# Patient Record
Sex: FEMALE | Race: WHITE | NOT HISPANIC OR LATINO | Employment: OTHER | URBAN - METROPOLITAN AREA
[De-identification: names, ages, dates, MRNs, and addresses within clinical notes are randomized per-mention and may not be internally consistent; named-entity substitution may affect disease eponyms.]

---

## 2020-09-15 ENCOUNTER — HOSPITAL ENCOUNTER (EMERGENCY)
Facility: HOSPITAL | Age: 50
Discharge: LEFT BEFORE DISCHARGE | End: 2020-09-15
Attending: EMERGENCY MEDICINE | Admitting: EMERGENCY MEDICINE

## 2020-09-15 VITALS
TEMPERATURE: 96.8 F | HEART RATE: 124 BPM | RESPIRATION RATE: 18 BRPM | WEIGHT: 150 LBS | BODY MASS INDEX: 24.99 KG/M2 | HEIGHT: 65 IN | OXYGEN SATURATION: 97 % | SYSTOLIC BLOOD PRESSURE: 134 MMHG | DIASTOLIC BLOOD PRESSURE: 89 MMHG

## 2020-09-15 DIAGNOSIS — F13.10 BENZODIAZEPINE ABUSE (HCC): ICD-10-CM

## 2020-09-15 DIAGNOSIS — F10.10 ALCOHOL ABUSE: Primary | ICD-10-CM

## 2020-09-15 LAB
ALBUMIN SERPL-MCNC: 4.4 G/DL (ref 3.5–5.2)
ALBUMIN/GLOB SERPL: 1.8 G/DL
ALP SERPL-CCNC: 82 U/L (ref 39–117)
ALT SERPL W P-5'-P-CCNC: 10 U/L (ref 1–33)
AMPHET+METHAMPHET UR QL: NEGATIVE
AMPHETAMINES UR QL: NEGATIVE
ANION GAP SERPL CALCULATED.3IONS-SCNC: 13.1 MMOL/L (ref 5–15)
AST SERPL-CCNC: 18 U/L (ref 1–32)
BACTERIA UR QL AUTO: ABNORMAL /HPF
BARBITURATES UR QL SCN: NEGATIVE
BASOPHILS # BLD AUTO: 0.02 10*3/MM3 (ref 0–0.2)
BASOPHILS NFR BLD AUTO: 0.3 % (ref 0–1.5)
BENZODIAZ UR QL SCN: POSITIVE
BILIRUB SERPL-MCNC: <0.2 MG/DL (ref 0–1.2)
BILIRUB UR QL STRIP: NEGATIVE
BUN SERPL-MCNC: 8 MG/DL (ref 6–20)
BUN/CREAT SERPL: 9.3 (ref 7–25)
BUPRENORPHINE SERPL-MCNC: NEGATIVE NG/ML
CALCIUM SPEC-SCNC: 9.1 MG/DL (ref 8.6–10.5)
CANNABINOIDS SERPL QL: NEGATIVE
CHLORIDE SERPL-SCNC: 103 MMOL/L (ref 98–107)
CLARITY UR: CLEAR
CO2 SERPL-SCNC: 24.9 MMOL/L (ref 22–29)
COCAINE UR QL: NEGATIVE
COLOR UR: YELLOW
CREAT SERPL-MCNC: 0.86 MG/DL (ref 0.57–1)
DEPRECATED RDW RBC AUTO: 49.8 FL (ref 37–54)
EOSINOPHIL # BLD AUTO: 0.07 10*3/MM3 (ref 0–0.4)
EOSINOPHIL NFR BLD AUTO: 1.2 % (ref 0.3–6.2)
ERYTHROCYTE [DISTWIDTH] IN BLOOD BY AUTOMATED COUNT: 13.5 % (ref 12.3–15.4)
ETHANOL BLD-MCNC: 71 MG/DL (ref 0–10)
ETHANOL UR QL: 0.07 %
GFR SERPL CREATININE-BSD FRML MDRD: 70 ML/MIN/1.73
GLOBULIN UR ELPH-MCNC: 2.5 GM/DL
GLUCOSE SERPL-MCNC: 105 MG/DL (ref 65–99)
GLUCOSE UR STRIP-MCNC: NEGATIVE MG/DL
HCT VFR BLD AUTO: 39.3 % (ref 34–46.6)
HGB BLD-MCNC: 12.8 G/DL (ref 12–15.9)
HGB UR QL STRIP.AUTO: ABNORMAL
HYALINE CASTS UR QL AUTO: ABNORMAL /LPF
IMM GRANULOCYTES # BLD AUTO: 0.04 10*3/MM3 (ref 0–0.05)
IMM GRANULOCYTES NFR BLD AUTO: 0.7 % (ref 0–0.5)
KETONES UR QL STRIP: NEGATIVE
LEUKOCYTE ESTERASE UR QL STRIP.AUTO: NEGATIVE
LYMPHOCYTES # BLD AUTO: 1.8 10*3/MM3 (ref 0.7–3.1)
LYMPHOCYTES NFR BLD AUTO: 30.5 % (ref 19.6–45.3)
MCH RBC QN AUTO: 32.3 PG (ref 26.6–33)
MCHC RBC AUTO-ENTMCNC: 32.6 G/DL (ref 31.5–35.7)
MCV RBC AUTO: 99.2 FL (ref 79–97)
METHADONE UR QL SCN: NEGATIVE
MONOCYTES # BLD AUTO: 0.41 10*3/MM3 (ref 0.1–0.9)
MONOCYTES NFR BLD AUTO: 6.9 % (ref 5–12)
NEUTROPHILS NFR BLD AUTO: 3.57 10*3/MM3 (ref 1.7–7)
NEUTROPHILS NFR BLD AUTO: 60.4 % (ref 42.7–76)
NITRITE UR QL STRIP: NEGATIVE
NRBC BLD AUTO-RTO: 0 /100 WBC (ref 0–0.2)
OPIATES UR QL: NEGATIVE
OXYCODONE UR QL SCN: NEGATIVE
PCP UR QL SCN: NEGATIVE
PH UR STRIP.AUTO: 6.5 [PH] (ref 4.5–8)
PLATELET # BLD AUTO: 364 10*3/MM3 (ref 140–450)
PMV BLD AUTO: 8.5 FL (ref 6–12)
POTASSIUM SERPL-SCNC: 3.9 MMOL/L (ref 3.5–5.2)
PROPOXYPH UR QL: NEGATIVE
PROT SERPL-MCNC: 6.9 G/DL (ref 6–8.5)
PROT UR QL STRIP: NEGATIVE
RBC # BLD AUTO: 3.96 10*6/MM3 (ref 3.77–5.28)
RBC # UR: ABNORMAL /HPF
REF LAB TEST METHOD: ABNORMAL
SODIUM SERPL-SCNC: 141 MMOL/L (ref 136–145)
SP GR UR STRIP: 1.01 (ref 1–1.03)
SQUAMOUS #/AREA URNS HPF: ABNORMAL /HPF
TRICYCLICS UR QL SCN: NEGATIVE
UROBILINOGEN UR QL STRIP: ABNORMAL
WBC # BLD AUTO: 5.91 10*3/MM3 (ref 3.4–10.8)
WBC UR QL AUTO: ABNORMAL /HPF

## 2020-09-15 PROCEDURE — 99282 EMERGENCY DEPT VISIT SF MDM: CPT

## 2020-09-15 PROCEDURE — 81001 URINALYSIS AUTO W/SCOPE: CPT | Performed by: EMERGENCY MEDICINE

## 2020-09-15 PROCEDURE — 80053 COMPREHEN METABOLIC PANEL: CPT | Performed by: EMERGENCY MEDICINE

## 2020-09-15 PROCEDURE — 80307 DRUG TEST PRSMV CHEM ANLYZR: CPT | Performed by: EMERGENCY MEDICINE

## 2020-09-15 PROCEDURE — 85025 COMPLETE CBC W/AUTO DIFF WBC: CPT | Performed by: EMERGENCY MEDICINE

## 2020-09-15 PROCEDURE — 99284 EMERGENCY DEPT VISIT MOD MDM: CPT | Performed by: EMERGENCY MEDICINE

## 2020-09-15 NOTE — ED NOTES
"Pt seemed very jumpy and agitated but cooperative. She stated she was here because she thought she was in liver failure because of how much alcohol she had been drinking lately.  She mentioned treatment but said clarified that she was in ER for bloodwork.  Daughter Michelle left phone number [phone].  Pt denied SI and HI \"not today\"     Cyndie Fatima RN  09/15/20 3444    "

## 2020-09-15 NOTE — ED NOTES
"Asked pt what she thought the ER could do for her today.  She said she wanted labs to find out if she was in liver failure \"since I'm itching and I looked that up online and that's one of the symptoms and i've been drinking a lot\"     Cyndie Fatima, RN  09/15/20 6908    "

## 2020-09-15 NOTE — ED NOTES
"Pt took BP cuff off before it was finished and refused to allow it to finish \"that shouldn't be painful\"     Cyndie Fatima, RN  09/15/20 6511    "

## 2020-09-15 NOTE — ED PROVIDER NOTES
EMERGENCY DEPARTMENT ENCOUNTER    Room Number:  D/D  Date of encounter:  9/15/2020  PCP: Provider, No Known  Historian: Patient     I used full protective equipment while examining this patient.  This includes face mask, gloves and protective eyewear.  I washed my hands before entering the room and immediately upon leaving the room      HPI:  Chief Complaint: Once to stop drinking alcohol  A complete HPI/ROS/PMH/PSH/SH/FH are unobtainable due to: None    Context: Emily Garcia is a 50 y.o. female who presents to the ED c/o wants to stop drinking alcohol.  Patient states she is here to get help with her drinking problem.  She states her last drink was about 1 hour ago and she drank about a pint.  She drinks about a pint on a daily basis.  She states she is also been using benzodiazepines which she gets off of the street periodically.  She says she took a 1 mg Xanax yesterday but has not taken any today.  She states she does not take them on a daily basis.  She states her reason for wanting to stop drinking is because she is worried about her body and she is worried she could be developing liver problems.  She denies suicidal ideation.  Patient states she was brought to the ER by her daughter who is not present at this time.  She states she is disabled but is unsure of her disability.  She states that she lives at home alone.  She denies chronic medical problems other than COPD and does not take medications on a regular basis but is supposed to be taking Prozac which she does not currently.  Patient states she does not currently use IV drugs but has used IV drugs in the remote past.      PAST MEDICAL HISTORY  Active Ambulatory Problems     Diagnosis Date Noted   • No Active Ambulatory Problems     Resolved Ambulatory Problems     Diagnosis Date Noted   • No Resolved Ambulatory Problems     Past Medical History:   Diagnosis Date   • Anxiety    • COPD (chronic obstructive pulmonary disease) (CMS/[facility])    • Depression     • OCD (obsessive compulsive disorder)    • Substance abuse (CMS/[facility])          PAST SURGICAL HISTORY  Past Surgical History:   Procedure Laterality Date   • CHOLECYSTECTOMY     • HYSTERECTOMY     • TUBAL ABDOMINAL LIGATION           FAMILY HISTORY  History reviewed. No pertinent family history.      SOCIAL HISTORY  Social History     Socioeconomic History   • Marital status:      Spouse name: Not on file   • Number of children: Not on file   • Years of education: Not on file   • Highest education level: Not on file   Tobacco Use   • Smoking status: Current Every Day Smoker     Packs/day: 1.00     Types: Cigarettes   Substance and Sexual Activity   • Alcohol use: Yes     Comment: last drink was an hour ago, a fifth every day for    • Drug use: Yes     Comment: benzos, last use yesterday, h/o iv drugs   • Sexual activity: Defer         ALLERGIES  Patient has no known allergies.       REVIEW OF SYSTEMS  Review of Systems   Constitutional: Positive for fatigue. Negative for fever.   HENT: Negative.  Negative for sore throat.    Eyes: Negative.    Respiratory: Negative.  Negative for cough.    Cardiovascular: Negative.  Negative for chest pain.   Gastrointestinal: Positive for nausea.   Genitourinary: Positive for dysuria.        LMP-status post partial hysterectomy   Musculoskeletal: Negative.  Negative for back pain.   Skin: Negative.  Negative for rash.   Neurological: Negative.  Negative for headaches.   Psychiatric/Behavioral: Positive for behavioral problems (Alcohol abuse).   All other systems reviewed and are negative.          PHYSICAL EXAM    I have reviewed the triage vital signs and nursing notes.    ED Triage Vitals [09/15/20 1735]   Temp Heart Rate Resp BP SpO2   96.8 °F (36 °C) (!) 124 18 -- 97 %      Temp src Heart Rate Source Patient Position BP Location FiO2 (%)   Temporal Monitor -- -- --       Physical Exam  GENERAL: Alert female who is in no apparent distress.  Her speech is coherent and  clear and she does not seem overtly intoxicated.  She is oriented x3.  HENT: nares patent, atraumatic-oropharynx clear  EYES: no scleral icterus  CV: regular rhythm, regular rate-no murmur.  Tachycardia noted in triage is not present on my examination  RESPIRATORY: normal effort, respirations unlabored-saturations 98% on room air  ABDOMEN: soft, mild diffuse tenderness without significant swelling  MUSCULOSKELETAL: no deformity- no unusual swelling or tenderness to palpation  NEURO: Strength, sensation, and coordination are grossly intact.  Speech and mentation are unremarkable  SKIN: warm, dry-no unusual rashes are noted      LAB RESULTS  Recent Results (from the past 24 hour(s))   Comprehensive Metabolic Panel    Collection Time: 09/15/20  6:16 PM    Specimen: Blood   Result Value Ref Range    Glucose 105 (H) 65 - 99 mg/dL    BUN 8 6 - 20 mg/dL    Creatinine 0.86 0.57 - 1.00 mg/dL    Sodium 141 136 - 145 mmol/L    Potassium 3.9 3.5 - 5.2 mmol/L    Chloride 103 98 - 107 mmol/L    CO2 24.9 22.0 - 29.0 mmol/L    Calcium 9.1 8.6 - 10.5 mg/dL    Total Protein 6.9 6.0 - 8.5 g/dL    Albumin 4.40 3.50 - 5.20 g/dL    ALT (SGPT) 10 1 - 33 U/L    AST (SGOT) 18 1 - 32 U/L    Alkaline Phosphatase 82 39 - 117 U/L    Total Bilirubin <0.2 0.0 - 1.2 mg/dL    eGFR Non African Amer 70 >60 mL/min/1.73    Globulin 2.5 gm/dL    A/G Ratio 1.8 g/dL    BUN/Creatinine Ratio 9.3 7.0 - 25.0    Anion Gap 13.1 5.0 - 15.0 mmol/L   Ethanol    Collection Time: 09/15/20  6:16 PM    Specimen: Blood   Result Value Ref Range    Ethanol 71 (H) 0 - 10 mg/dL    Ethanol % 0.071 %   CBC Auto Differential    Collection Time: 09/15/20  6:16 PM    Specimen: Blood   Result Value Ref Range    WBC 5.91 3.40 - 10.80 10*3/mm3    RBC 3.96 3.77 - 5.28 10*6/mm3    Hemoglobin 12.8 12.0 - 15.9 g/dL    Hematocrit 39.3 34.0 - 46.6 %    MCV 99.2 (H) 79.0 - 97.0 fL    MCH 32.3 26.6 - 33.0 pg    MCHC 32.6 31.5 - 35.7 g/dL    RDW 13.5 12.3 - 15.4 %    RDW-SD 49.8 37.0 -  54.0 fl    MPV 8.5 6.0 - 12.0 fL    Platelets 364 140 - 450 10*3/mm3    Neutrophil % 60.4 42.7 - 76.0 %    Lymphocyte % 30.5 19.6 - 45.3 %    Monocyte % 6.9 5.0 - 12.0 %    Eosinophil % 1.2 0.3 - 6.2 %    Basophil % 0.3 0.0 - 1.5 %    Immature Grans % 0.7 (H) 0.0 - 0.5 %    Neutrophils, Absolute 3.57 1.70 - 7.00 10*3/mm3    Lymphocytes, Absolute 1.80 0.70 - 3.10 10*3/mm3    Monocytes, Absolute 0.41 0.10 - 0.90 10*3/mm3    Eosinophils, Absolute 0.07 0.00 - 0.40 10*3/mm3    Basophils, Absolute 0.02 0.00 - 0.20 10*3/mm3    Immature Grans, Absolute 0.04 0.00 - 0.05 10*3/mm3    nRBC 0.0 0.0 - 0.2 /100 WBC   Urine Drug Screen - Urine, Clean Catch    Collection Time: 09/15/20  6:17 PM    Specimen: Urine, Clean Catch   Result Value Ref Range    THC, Screen, Urine Negative Negative    Phencyclidine (PCP), Urine Negative Negative    Cocaine Screen, Urine Negative Negative    Methamphetamine, Ur Negative Negative    Opiate Screen Negative Negative    Amphetamine Screen, Urine Negative Negative    Benzodiazepine Screen, Urine Positive (A) Negative    Tricyclic Antidepressants Screen Negative Negative    Methadone Screen, Urine Negative Negative    Barbiturates Screen, Urine Negative Negative    Oxycodone Screen, Urine Negative Negative    Propoxyphene Screen Negative Negative    Buprenorphine, Screen, Urine Negative Negative   Urinalysis With Microscopic If Indicated (No Culture) - Urine, Clean Catch    Collection Time: 09/15/20  6:17 PM    Specimen: Urine, Clean Catch   Result Value Ref Range    Color, UA Yellow Yellow, Straw    Appearance, UA Clear Clear    pH, UA 6.5 4.5 - 8.0    Specific Gravity, UA 1.010 1.003 - 1.030    Glucose, UA Negative Negative    Ketones, UA Negative Negative    Bilirubin, UA Negative Negative    Blood, UA Trace (A) Negative    Protein, UA Negative Negative    Leuk Esterase, UA Negative Negative    Nitrite, UA Negative Negative    Urobilinogen, UA 0.2 E.U./dL 0.2 - 1.0 E.U./dL   Urinalysis,  Microscopic Only - Urine, Clean Catch    Collection Time: 09/15/20  6:17 PM    Specimen: Urine, Clean Catch   Result Value Ref Range    RBC, UA 0-2 (A) None Seen /HPF    WBC, UA 3-5 (A) None Seen /HPF    Bacteria, UA Trace (A) None Seen /HPF    Squamous Epithelial Cells, UA 0-2 None Seen, 0-2 /HPF    Hyaline Casts, UA None Seen None Seen /LPF    Methodology Manual Light Microscopy        Ordered the above labs and independently reviewed the results.      RADIOLOGY  No Radiology Exams Resulted Within Past 24 Hours    I ordered the above noted radiological studies. Reviewed by me and discussed with radiologist.  See dictation for official radiology interpretation.      PROCEDURES  Procedures      MEDICATIONS GIVEN IN ER    Medications - No data to display      PROGRESS, DATA ANALYSIS, CONSULTS, AND MEDICAL DECISION MAKING    All labs have been independently reviewed by me.  All radiology studies have been reviewed by me and discussed with radiologist dictating the report.   EKG's independently viewed and interpreted by me.  Discussion below represents my analysis of pertinent findings related to patient's condition, differential diagnosis, treatment plan and final disposition.      ED Course as of Sep 15 2012   Tue Sep 15, 2020   1801 [id number]-30-[id number]-old female with history of alcohol abuse as well as abuse of benzodiazepines currently.  She is not using IV drugs at this time.  She is not suicidal.  Last use of alcohol was prior to arrival.    [DB]   1930 Labs reviewed and are fairly unremarkable.  Alcohol level is not very elevated at 71.  Left teas and chemistries are fairly unremarkable.  CBC is also fairly reassuring.  Urine drug screen does show benzodiazepines which she admits to abusing.  At this point patient looks fairly stable from a medical standpoint but will offer psychiatry consultation via the time team.    [DB]   1946 Notified patient of her reassuring blood testing and told her that we would obtain time  team counseling for possible alcohol treatment referral.  Patient remains well-appearing and does not show signs of would suggest alcohol withdrawal.    [DB]   2011 RN notified me that patient left the emergency department without notifying staff.  Patient was sober both on my clinical judgment and based on blood alcohol level.  Her labs were fairly unremarkable and she was not suicidal.  I had no reason to hold her against her will.  She did really leave before receiving any discharge instructions.    [DB]      ED Course User Index  [DB] Garcia Donaldson MD       AS OF 20:12 EDT VITALS:    BP - 134/89  HR - (!) 124  TEMP - 96.8 °F (36 °C) (Temporal)  O2 SATS - 97%      DIAGNOSIS  Final diagnoses:   Alcohol abuse   Benzodiazepine abuse (CMS/HCC)         DISPOSITION  Left receiving instructions, prior to completion of work-up.         Garcia Donaldson MD  09/15/20 204

## 2020-09-16 NOTE — ED NOTES
Patient found to not be in her room. Registration states that patient walked out. Patient did not have an IV in place and was not on a hold. Dr. Donaldson notified.      Lucrecia Sorto RN  09/15/20 2007

## 2021-08-13 ENCOUNTER — HOSPITAL ENCOUNTER (EMERGENCY)
Facility: HOSPITAL | Age: 51
Discharge: LEFT BEFORE DISCHARGE | End: 2021-08-13
Attending: EMERGENCY MEDICINE

## 2021-08-13 VITALS
TEMPERATURE: 98.2 F | DIASTOLIC BLOOD PRESSURE: 94 MMHG | HEART RATE: 90 BPM | OXYGEN SATURATION: 98 % | SYSTOLIC BLOOD PRESSURE: 148 MMHG | BODY MASS INDEX: 23.9 KG/M2 | WEIGHT: 140 LBS | HEIGHT: 64 IN | RESPIRATION RATE: 20 BRPM

## 2021-08-13 PROCEDURE — 99211 OFF/OP EST MAY X REQ PHY/QHP: CPT

## 2022-03-26 ENCOUNTER — APPOINTMENT (OUTPATIENT)
Dept: CT IMAGING | Facility: HOSPITAL | Age: 52
End: 2022-03-26

## 2022-03-26 ENCOUNTER — APPOINTMENT (OUTPATIENT)
Dept: GENERAL RADIOLOGY | Facility: HOSPITAL | Age: 52
End: 2022-03-26

## 2022-03-26 ENCOUNTER — HOSPITAL ENCOUNTER (EMERGENCY)
Facility: HOSPITAL | Age: 52
Discharge: HOME | End: 2022-03-27
Attending: EMERGENCY MEDICINE | Admitting: EMERGENCY MEDICINE

## 2022-03-26 VITALS
BODY MASS INDEX: 34.85 KG/M2 | TEMPERATURE: 98 F | SYSTOLIC BLOOD PRESSURE: 147 MMHG | WEIGHT: 209.2 LBS | DIASTOLIC BLOOD PRESSURE: 90 MMHG | HEART RATE: 100 BPM | HEIGHT: 65 IN | OXYGEN SATURATION: 97 % | RESPIRATION RATE: 18 BRPM

## 2022-03-26 DIAGNOSIS — R06.02 SHORTNESS OF BREATH: ICD-10-CM

## 2022-03-26 DIAGNOSIS — R10.84 GENERALIZED ABDOMINAL PAIN: ICD-10-CM

## 2022-03-26 DIAGNOSIS — N83.202 CYST OF LEFT OVARY: Primary | ICD-10-CM

## 2022-03-26 DIAGNOSIS — Z72.0 TOBACCO ABUSE: ICD-10-CM

## 2022-03-26 LAB
ALBUMIN SERPL-MCNC: 4.6 G/DL (ref 3.5–5.2)
ALBUMIN/GLOB SERPL: 2 G/DL
ALP SERPL-CCNC: 121 U/L (ref 39–117)
ALT SERPL W P-5'-P-CCNC: 34 U/L (ref 1–33)
ANION GAP SERPL CALCULATED.3IONS-SCNC: 11.9 MMOL/L (ref 5–15)
APTT PPP: 28.1 SECONDS (ref 24.3–38.1)
AST SERPL-CCNC: 32 U/L (ref 1–32)
BACTERIA UR QL AUTO: ABNORMAL /HPF
BASOPHILS # BLD AUTO: 0.02 10*3/MM3 (ref 0–0.2)
BASOPHILS NFR BLD AUTO: 0.3 % (ref 0–1.5)
BILIRUB SERPL-MCNC: 0.2 MG/DL (ref 0–1.2)
BILIRUB UR QL STRIP: NEGATIVE
BUN SERPL-MCNC: 13 MG/DL (ref 6–20)
BUN/CREAT SERPL: 12.9 (ref 7–25)
CALCIUM SPEC-SCNC: 9.7 MG/DL (ref 8.6–10.5)
CHLORIDE SERPL-SCNC: 103 MMOL/L (ref 98–107)
CLARITY UR: CLEAR
CO2 SERPL-SCNC: 24.1 MMOL/L (ref 22–29)
COLOR UR: YELLOW
CREAT SERPL-MCNC: 1.01 MG/DL (ref 0.57–1)
D DIMER PPP FEU-MCNC: 0.45 MCGFEU/ML (ref 0–0.46)
DEPRECATED RDW RBC AUTO: 51 FL (ref 37–54)
EGFRCR SERPLBLD CKD-EPI 2021: 67.1 ML/MIN/1.73
EOSINOPHIL # BLD AUTO: 0.04 10*3/MM3 (ref 0–0.4)
EOSINOPHIL NFR BLD AUTO: 0.5 % (ref 0.3–6.2)
ERYTHROCYTE [DISTWIDTH] IN BLOOD BY AUTOMATED COUNT: 14.4 % (ref 12.3–15.4)
GLOBULIN UR ELPH-MCNC: 2.3 GM/DL
GLUCOSE SERPL-MCNC: 117 MG/DL (ref 65–99)
GLUCOSE UR STRIP-MCNC: NEGATIVE MG/DL
HCT VFR BLD AUTO: 38.7 % (ref 34–46.6)
HGB BLD-MCNC: 12.6 G/DL (ref 12–15.9)
HGB UR QL STRIP.AUTO: ABNORMAL
HYALINE CASTS UR QL AUTO: ABNORMAL /LPF
IMM GRANULOCYTES # BLD AUTO: 0.03 10*3/MM3 (ref 0–0.05)
IMM GRANULOCYTES NFR BLD AUTO: 0.4 % (ref 0–0.5)
INR PPP: 0.81 (ref 0.9–1.1)
KETONES UR QL STRIP: NEGATIVE
LEUKOCYTE ESTERASE UR QL STRIP.AUTO: NEGATIVE
LYMPHOCYTES # BLD AUTO: 1.8 10*3/MM3 (ref 0.7–3.1)
LYMPHOCYTES NFR BLD AUTO: 24.7 % (ref 19.6–45.3)
MCH RBC QN AUTO: 31.3 PG (ref 26.6–33)
MCHC RBC AUTO-ENTMCNC: 32.6 G/DL (ref 31.5–35.7)
MCV RBC AUTO: 96 FL (ref 79–97)
MONOCYTES # BLD AUTO: 0.45 10*3/MM3 (ref 0.1–0.9)
MONOCYTES NFR BLD AUTO: 6.2 % (ref 5–12)
NEUTROPHILS NFR BLD AUTO: 4.95 10*3/MM3 (ref 1.7–7)
NEUTROPHILS NFR BLD AUTO: 67.9 % (ref 42.7–76)
NITRITE UR QL STRIP: NEGATIVE
NRBC BLD AUTO-RTO: 0 /100 WBC (ref 0–0.2)
NT-PROBNP SERPL-MCNC: 10.2 PG/ML (ref 0–900)
PH UR STRIP.AUTO: <=5 [PH] (ref 4.5–8)
PLATELET # BLD AUTO: 370 10*3/MM3 (ref 140–450)
PMV BLD AUTO: 8.6 FL (ref 6–12)
POTASSIUM SERPL-SCNC: 3.6 MMOL/L (ref 3.5–5.2)
PROT SERPL-MCNC: 6.9 G/DL (ref 6–8.5)
PROT UR QL STRIP: NEGATIVE
PROTHROMBIN TIME: 11.3 SECONDS (ref 12.1–15)
RBC # BLD AUTO: 4.03 10*6/MM3 (ref 3.77–5.28)
RBC # UR STRIP: ABNORMAL /HPF
REF LAB TEST METHOD: ABNORMAL
SODIUM SERPL-SCNC: 139 MMOL/L (ref 136–145)
SP GR UR STRIP: 1.01 (ref 1–1.03)
SQUAMOUS #/AREA URNS HPF: ABNORMAL /HPF
TROPONIN T SERPL-MCNC: <0.01 NG/ML (ref 0–0.03)
UROBILINOGEN UR QL STRIP: ABNORMAL
WBC # UR STRIP: ABNORMAL /HPF
WBC NRBC COR # BLD: 7.29 10*3/MM3 (ref 3.4–10.8)

## 2022-03-26 PROCEDURE — 80053 COMPREHEN METABOLIC PANEL: CPT | Performed by: EMERGENCY MEDICINE

## 2022-03-26 PROCEDURE — 93005 ELECTROCARDIOGRAM TRACING: CPT | Performed by: EMERGENCY MEDICINE

## 2022-03-26 PROCEDURE — 74177 CT ABD & PELVIS W/CONTRAST: CPT

## 2022-03-26 PROCEDURE — 71046 X-RAY EXAM CHEST 2 VIEWS: CPT

## 2022-03-26 PROCEDURE — 0 IOPAMIDOL PER 1 ML: Performed by: EMERGENCY MEDICINE

## 2022-03-26 PROCEDURE — 93010 ELECTROCARDIOGRAM REPORT: CPT | Performed by: INTERNAL MEDICINE

## 2022-03-26 PROCEDURE — 85025 COMPLETE CBC W/AUTO DIFF WBC: CPT | Performed by: EMERGENCY MEDICINE

## 2022-03-26 PROCEDURE — 85379 FIBRIN DEGRADATION QUANT: CPT | Performed by: EMERGENCY MEDICINE

## 2022-03-26 PROCEDURE — 85730 THROMBOPLASTIN TIME PARTIAL: CPT | Performed by: EMERGENCY MEDICINE

## 2022-03-26 PROCEDURE — 84484 ASSAY OF TROPONIN QUANT: CPT | Performed by: EMERGENCY MEDICINE

## 2022-03-26 PROCEDURE — 99283 EMERGENCY DEPT VISIT LOW MDM: CPT

## 2022-03-26 PROCEDURE — 85610 PROTHROMBIN TIME: CPT | Performed by: EMERGENCY MEDICINE

## 2022-03-26 PROCEDURE — 83880 ASSAY OF NATRIURETIC PEPTIDE: CPT | Performed by: EMERGENCY MEDICINE

## 2022-03-26 PROCEDURE — 81001 URINALYSIS AUTO W/SCOPE: CPT | Performed by: EMERGENCY MEDICINE

## 2022-03-26 RX ORDER — SODIUM CHLORIDE 0.9 % (FLUSH) 0.9 %
10 SYRINGE (ML) INJECTION AS NEEDED
Status: DISCONTINUED | OUTPATIENT
Start: 2022-03-26 | End: 2022-03-27 | Stop reason: HOSPADM

## 2022-03-26 RX ADMIN — IOPAMIDOL 100 ML: 755 INJECTION, SOLUTION INTRAVENOUS at 23:52

## 2022-03-27 LAB — QT INTERVAL: 323 MS

## 2022-03-27 PROCEDURE — 99284 EMERGENCY DEPT VISIT MOD MDM: CPT | Performed by: EMERGENCY MEDICINE

## 2022-03-27 NOTE — ED PROVIDER NOTES
"Subjective   History of Present Illness  History of Present Illness    Chief complaint: Abdominal swelling and discomfort, shortness of breath, back pain    Location: Abdomen, low back, along with    Quality/Severity: Mild to moderate symptoms    Timing/Duration: Persistent for the past 2 to 3 months or longer    Modifying Factors: None    Narrative: This patient presents for evaluation of several separate concerns.  She says that she has had unintended weight gain over the past 2 years while the Covid pandemic has occurred.  She has not been out and active as much as normal.  She has had increased swelling throughout the abdomen and both legs.  This causes some discomfort all throughout the abdomen and in the low back area.  Finally she also reports a lot of shortness of breath especially with activities.  Although she does tell me that she smokes 1 pack of cigarettes per day.  Apparently she says that she \"just recently worked out her insurance\" and therefore wanted to come in tonight so that we can \"put her through the ringer of tests\" to find out what is going on.    Associated Symptoms: As above    Review of Systems   Constitutional: Positive for fatigue. Negative for activity change, diaphoresis and fever.   HENT: Negative.    Eyes: Negative for pain and visual disturbance.   Respiratory: Positive for shortness of breath. Negative for cough.    Cardiovascular: Positive for leg swelling. Negative for chest pain.   Gastrointestinal: Positive for abdominal pain. Negative for diarrhea and vomiting.   Genitourinary: Negative for dysuria, frequency and hematuria.   Musculoskeletal: Positive for back pain and myalgias.   Skin: Negative for color change and rash.   Neurological: Negative for syncope and headaches.   All other systems reviewed and are negative.      Past Medical History:   Diagnosis Date   • Anxiety    • COPD (chronic obstructive pulmonary disease) (HCC)    • Depression    • OCD (obsessive compulsive " disorder)    • PTSD (post-traumatic stress disorder)    • Substance abuse (HCC)        No Known Allergies    Past Surgical History:   Procedure Laterality Date   • CHOLECYSTECTOMY     • HYSTERECTOMY     • TUBAL ABDOMINAL LIGATION         History reviewed. No pertinent family history.    Social History     Socioeconomic History   • Marital status:    Tobacco Use   • Smoking status: Current Every Day Smoker     Packs/day: 1.00     Types: Cigarettes   Substance and Sexual Activity   • Alcohol use: Yes     Comment: Pt states last drink was 1 WEEK AGO   • Drug use: Yes     Types: Marijuana     Comment: benzos, h/o iv drugs ( crack)   • Sexual activity: Defer     ED Triage Vitals [03/26/22 2232]   Temp Heart Rate Resp BP SpO2   98 °F (36.7 °C) 100 18 147/90 97 %      Temp src Heart Rate Source Patient Position BP Location FiO2 (%)   -- -- -- -- --     Objective   Physical Exam  Vitals and nursing note reviewed.   Constitutional:       General: She is not in acute distress.     Appearance: She is well-developed. She is not ill-appearing, toxic-appearing or diaphoretic.   HENT:      Head: Normocephalic and atraumatic.   Eyes:      General:         Right eye: No discharge.         Left eye: No discharge.      Pupils: Pupils are equal, round, and reactive to light.   Cardiovascular:      Rate and Rhythm: Normal rate and regular rhythm.      Pulses: Normal pulses.      Heart sounds: Normal heart sounds. No murmur heard.    No friction rub. No gallop.   Pulmonary:      Effort: Pulmonary effort is normal. No accessory muscle usage or respiratory distress.      Breath sounds: No decreased breath sounds, rhonchi or rales.   Chest:      Chest wall: No mass.   Abdominal:      General: Bowel sounds are normal.      Palpations: Abdomen is soft. There is no hepatomegaly or splenomegaly.      Tenderness: There is no guarding or rebound.      Comments: Very mild diffuse tenderness to deep palpation equally in all 4 quadrants.  No  peritoneal features anywhere.   Musculoskeletal:         General: No deformity. Normal range of motion.      Cervical back: Normal range of motion and neck supple.      Right lower leg: Edema present.      Left lower leg: Edema present.      Comments: Mild 1+ edema bilateral lower extremities   Skin:     General: Skin is warm and dry.      Findings: No erythema or rash.   Neurological:      General: No focal deficit present.      Mental Status: She is alert and oriented to person, place, and time.   Psychiatric:         Behavior: Behavior normal.         Thought Content: Thought content normal.         Judgment: Judgment normal.       EKG           EKG time/Interp time: 2249/2255  Rhythm/Rate: Sinus tachycardia, 120 bpm  P waves and AL: P waves are present, 151 ms  QRS, axis: 87 ms, normal axis  ST and T waves: No acute ischemic changes are evident    Independently interpreted by me contemporaneously with treatment    Results for orders placed or performed during the hospital encounter of 03/26/22   Comprehensive Metabolic Panel    Specimen: Blood   Result Value Ref Range    Glucose 117 (H) 65 - 99 mg/dL    BUN 13 6 - 20 mg/dL    Creatinine 1.01 (H) 0.57 - 1.00 mg/dL    Sodium 139 136 - 145 mmol/L    Potassium 3.6 3.5 - 5.2 mmol/L    Chloride 103 98 - 107 mmol/L    CO2 24.1 22.0 - 29.0 mmol/L    Calcium 9.7 8.6 - 10.5 mg/dL    Total Protein 6.9 6.0 - 8.5 g/dL    Albumin 4.60 3.50 - 5.20 g/dL    ALT (SGPT) 34 (H) 1 - 33 U/L    AST (SGOT) 32 1 - 32 U/L    Alkaline Phosphatase 121 (H) 39 - 117 U/L    Total Bilirubin 0.2 0.0 - 1.2 mg/dL    Globulin 2.3 gm/dL    A/G Ratio 2.0 g/dL    BUN/Creatinine Ratio 12.9 7.0 - 25.0    Anion Gap 11.9 5.0 - 15.0 mmol/L    eGFR 67.1 >60.0 mL/min/1.73   Protime-INR    Specimen: Blood   Result Value Ref Range    Protime 11.3 (L) 12.1 - 15.0 Seconds    INR 0.81 (L) 0.90 - 1.10   aPTT    Specimen: Blood   Result Value Ref Range    PTT 28.1 24.3 - 38.1 seconds   Urinalysis With Culture If  Indicated - Urine, Clean Catch    Specimen: Urine, Clean Catch   Result Value Ref Range    Color, UA Yellow Yellow, Straw    Appearance, UA Clear Clear    pH, UA <=5.0 4.5 - 8.0    Specific Gravity, UA 1.015 1.003 - 1.030    Glucose, UA Negative Negative    Ketones, UA Negative Negative    Bilirubin, UA Negative Negative    Blood, UA Moderate (2+) (A) Negative    Protein, UA Negative Negative    Leuk Esterase, UA Negative Negative    Nitrite, UA Negative Negative    Urobilinogen, UA 0.2 E.U./dL 0.2 - 1.0 E.U./dL   Troponin    Specimen: Blood   Result Value Ref Range    Troponin T <0.010 0.000 - 0.030 ng/mL   BNP    Specimen: Blood   Result Value Ref Range    proBNP 10.2 0.0 - 900.0 pg/mL   D-dimer, Quantitative    Specimen: Blood   Result Value Ref Range    D-Dimer, Quantitative 0.45 0.00 - 0.46 MCGFEU/mL   CBC Auto Differential    Specimen: Blood   Result Value Ref Range    WBC 7.29 3.40 - 10.80 10*3/mm3    RBC 4.03 3.77 - 5.28 10*6/mm3    Hemoglobin 12.6 12.0 - 15.9 g/dL    Hematocrit 38.7 34.0 - 46.6 %    MCV 96.0 79.0 - 97.0 fL    MCH 31.3 26.6 - 33.0 pg    MCHC 32.6 31.5 - 35.7 g/dL    RDW 14.4 12.3 - 15.4 %    RDW-SD 51.0 37.0 - 54.0 fl    MPV 8.6 6.0 - 12.0 fL    Platelets 370 140 - 450 10*3/mm3    Neutrophil % 67.9 42.7 - 76.0 %    Lymphocyte % 24.7 19.6 - 45.3 %    Monocyte % 6.2 5.0 - 12.0 %    Eosinophil % 0.5 0.3 - 6.2 %    Basophil % 0.3 0.0 - 1.5 %    Immature Grans % 0.4 0.0 - 0.5 %    Neutrophils, Absolute 4.95 1.70 - 7.00 10*3/mm3    Lymphocytes, Absolute 1.80 0.70 - 3.10 10*3/mm3    Monocytes, Absolute 0.45 0.10 - 0.90 10*3/mm3    Eosinophils, Absolute 0.04 0.00 - 0.40 10*3/mm3    Basophils, Absolute 0.02 0.00 - 0.20 10*3/mm3    Immature Grans, Absolute 0.03 0.00 - 0.05 10*3/mm3    nRBC 0.0 0.0 - 0.2 /100 WBC   Urinalysis, Microscopic Only - Urine, Clean Catch    Specimen: Urine, Clean Catch   Result Value Ref Range    RBC, UA 0-2 (A) None Seen /HPF    WBC, UA 0-2 (A) None Seen /HPF    Bacteria, UA  "Trace (A) None Seen /HPF    Squamous Epithelial Cells, UA 3-6 (A) None Seen, 0-2 /HPF    Hyaline Casts, UA None Seen None Seen /LPF    Methodology Manual Light Microscopy    ECG 12 Lead   Result Value Ref Range    QT Interval 323 ms       RADIOLOGY        Study: Chest x-ray    Findings: No acute cardiopulmonary findings    Interpreted contemporaneously with treatment by Dr. Narayanan, independently viewed by me    RADIOLOGY        Study: CT abdomen and pelvis with contrast    Findings: 1. No clearly acute process in the abdomen or pelvis. Normal appendix.  2. Surgical absence of the gallbladder and uterus.  3. 2.9 cm left ovarian cyst, likely a benign postmenopausal cyst. This could be confirmed with nonemergent outpatient ultrasound.  4. Left renal cyst.       Interpreted contemporaneously with treatment by Dr. Sepulveda, independently viewed by me    Procedures           ED Course  ED Course as of 03/27/22 0109   Sun Mar 27, 2022   0108 I reviewed the EKG and labs and radiology reports from today's visit.  All findings are rather reassuring.  There is an incidental ovarian cyst noted but that is not suspected to be the source of her abdominal pain or swelling.  I believe a lot of her symptoms are simply from excessive unintended weight gain.  I also spent some time encouraging her to quit smoking as I suspect that is affecting her breathing and shortness of breath feeling with activities.  Finally I urged her to follow-up with a local PCP for further evaluation and outpatient management of her symptoms.  Then, patient was discharged home in stable condition.  We reviewed with her usual \"return to ER\" instructions prior to discharge [IGNACIO]      ED Course User Index  [IGNACIO] Andrew Bullock MD                                                 MDM  Number of Diagnoses or Management Options     Amount and/or Complexity of Data Reviewed  Clinical lab tests: reviewed and ordered  Tests in the radiology section of CPT®: reviewed and " ordered  Tests in the medicine section of CPT®: reviewed  Decide to obtain previous medical records or to obtain history from someone other than the patient: yes  Review and summarize past medical records: yes  Independent visualization of images, tracings, or specimens: yes    Risk of Complications, Morbidity, and/or Mortality  Presenting problems: moderate  Diagnostic procedures: moderate  Management options: moderate        Final diagnoses:   Cyst of left ovary   Generalized abdominal pain   Tobacco abuse   Shortness of breath       ED Disposition  ED Disposition     ED Disposition   Discharge    Condition   Stable    Comment   --             Local PCP    Schedule an appointment as soon as possible for a visit in 1 week  Follow-up with local PCP for further evaluation         Medication List      No changes were made to your prescriptions during this visit.          Andrew Bullock MD  03/27/22 0110

## 2022-06-09 ENCOUNTER — OFFICE VISIT (OUTPATIENT)
Dept: FAMILY MEDICINE CLINIC | Facility: CLINIC | Age: 52
End: 2022-06-09

## 2022-06-09 VITALS
HEIGHT: 65 IN | HEART RATE: 91 BPM | SYSTOLIC BLOOD PRESSURE: 118 MMHG | WEIGHT: 183 LBS | OXYGEN SATURATION: 97 % | RESPIRATION RATE: 16 BRPM | DIASTOLIC BLOOD PRESSURE: 74 MMHG | BODY MASS INDEX: 30.49 KG/M2

## 2022-06-09 DIAGNOSIS — F43.10 PTSD (POST-TRAUMATIC STRESS DISORDER): ICD-10-CM

## 2022-06-09 DIAGNOSIS — Z13.220 SCREENING, LIPID: ICD-10-CM

## 2022-06-09 DIAGNOSIS — Z72.0 TOBACCO ABUSE: ICD-10-CM

## 2022-06-09 DIAGNOSIS — F33.1 MODERATE RECURRENT MAJOR DEPRESSION: ICD-10-CM

## 2022-06-09 DIAGNOSIS — F41.1 GAD (GENERALIZED ANXIETY DISORDER): ICD-10-CM

## 2022-06-09 DIAGNOSIS — Z12.31 SCREENING MAMMOGRAM FOR BREAST CANCER: Primary | ICD-10-CM

## 2022-06-09 DIAGNOSIS — Z87.448 HISTORY OF HEMATURIA: ICD-10-CM

## 2022-06-09 DIAGNOSIS — K70.0 FATTY LIVER DUE TO ALCOHOLISM: ICD-10-CM

## 2022-06-09 DIAGNOSIS — Z13.228 ENCOUNTER FOR SCREENING FOR METABOLIC DISORDER: ICD-10-CM

## 2022-06-09 DIAGNOSIS — Z11.59 ENCOUNTER FOR HEPATITIS C VIRUS SCREENING TEST FOR HIGH RISK PATIENT: ICD-10-CM

## 2022-06-09 DIAGNOSIS — Z91.89 ENCOUNTER FOR HEPATITIS C VIRUS SCREENING TEST FOR HIGH RISK PATIENT: ICD-10-CM

## 2022-06-09 LAB
BILIRUB BLD-MCNC: ABNORMAL MG/DL
CLARITY, POC: CLEAR
COLOR UR: YELLOW
EXPIRATION DATE: ABNORMAL
GLUCOSE UR STRIP-MCNC: NEGATIVE MG/DL
KETONES UR QL: NEGATIVE
LEUKOCYTE EST, POC: NEGATIVE
Lab: ABNORMAL
NITRITE UR-MCNC: NEGATIVE MG/ML
PH UR: 6 [PH] (ref 5–8)
PROT UR STRIP-MCNC: NEGATIVE MG/DL
RBC # UR STRIP: ABNORMAL /UL
SP GR UR: 1.01 (ref 1–1.03)
UROBILINOGEN UR QL: NORMAL

## 2022-06-09 PROCEDURE — 81003 URINALYSIS AUTO W/O SCOPE: CPT | Performed by: NURSE PRACTITIONER

## 2022-06-09 PROCEDURE — 99204 OFFICE O/P NEW MOD 45 MIN: CPT | Performed by: NURSE PRACTITIONER

## 2022-06-09 RX ORDER — QUETIAPINE FUMARATE 25 MG/1
25 TABLET, FILM COATED ORAL NIGHTLY
Qty: 30 TABLET | Refills: 1 | Status: SHIPPED | OUTPATIENT
Start: 2022-06-09 | End: 2022-06-29

## 2022-06-09 RX ORDER — ARIPIPRAZOLE 5 MG/1
5 TABLET ORAL DAILY
Qty: 30 TABLET | Refills: 1 | Status: SHIPPED | OUTPATIENT
Start: 2022-06-09 | End: 2022-06-29

## 2022-06-09 RX ORDER — FLUOXETINE 20 MG/1
20 TABLET, FILM COATED ORAL DAILY
Qty: 30 TABLET | Refills: 1 | Status: SHIPPED | OUTPATIENT
Start: 2022-06-09 | End: 2022-08-03

## 2022-06-09 NOTE — PROGRESS NOTES
Subjective   Emily Garcia is a 51 y.o. female.     History of Present Illness   Patient is new to this provider and practice.  She is here to establish primary care.    Patient is here for the management of acute on chronic anxiety, OCD and PTSD. Was referred to [facility] but never heard back.  She reports she previously [facility] [place].  She was previously on prozac , abilify and seroquel , but she cannot recall previous doses.  She denies SI,HI, Denies hallucinations.  She is visibly anxious on exam today.  She is asking to restart these medications. She is desperate for sleep, takes benadryl for sleep.  She is only sleeping a few hours at a time.  Patient's brother is living with her but she does not have good family support.    PHQ-9 Depression Screening  Little interest or pleasure in doing things?  0   Feeling down, depressed, or hopeless?  3   Trouble falling or staying asleep, or sleeping too much?  3   Feeling tired or having little energy?  3   Poor appetite or overeating?  3   Feeling bad about yourself - or that you are a failure or have let yourself or your family down?  3   Trouble concentrating on things, such as reading the newspaper or watching television?  3   Moving or speaking so slowly that other people could have noticed? Or the opposite - being so fidgety or restless that you have been moving around a lot more than usual?  3   Thoughts that you would be better off dead, or of hurting yourself in some way?  3   PHQ-9 Total Score  24   If you checked off any problems, how difficult have these problems made it for you to do your work, take care of things at home, or get along with other people?  severe, very difficult       Patient presents with chronic history of alcoholism and drug abuse ( benzos and crack, denies IV drug use).  She had an episode last year September, 2020 where she reported to the ER to stop drinking.  She tested positive for benzos and had been drinking.   She left AMA. She denies drug use or alcohol use since March. She was previously prescribed benzos for anxiety and reports that Klonopin is the only drug that is ever helped her.    Patient presents with COPD x years.  She does not routinely use   inhalers. She was using combivent inhaler. She reports coughing, wheezing, smokes 1 ppd. She has tried patches in the past.  She has not tried Wellbutrin or Chantix and is not wanting to try Chantix with PTSD history.    Patient is concerned about abdomial bloating and distention x1 year.  No nausea, no vomiting. She denies ankle or leg swelling, denies SOA.  Today's weight 183lb  and she reports she usually weighs around 140lb (prev 140s 9/2021).  On chart review, patient had abdominal CT scan done on 3- 2022 that showed fatty liver.  No adenopathy or acute concerns.    Patient presents with concerns for chronic hematuria.  She reports she has usually had trace blood in her urine.  On today's dipstick she had moderate blood in her urine, negative for nitrites, negative for WBCs, negative leukocytes.  On chart review, and in March, 2022 patient had moderate blood on her UA.    The following portions of the patient's history were reviewed and updated as appropriate: allergies, current medications, past family history, past medical history, past social history, past surgical history and problem list.    Review of Systems   Constitutional: Positive for unexpected weight gain. Negative for activity change, fatigue and unexpected weight loss.   HENT: Negative for congestion and postnasal drip.         Dental exam due    Eyes: Negative for blurred vision and double vision.        No glasses or contact   Respiratory: Positive for cough and wheezing. Negative for chest tightness.    Cardiovascular: Negative for chest pain, palpitations (occasional, h/o tachycardia) and leg swelling.   Gastrointestinal: Positive for abdominal pain, diarrhea and GERD. Negative for blood in stool,  constipation, nausea and vomiting.   Endocrine: Negative for cold intolerance, heat intolerance, polydipsia, polyphagia and polyuria.   Genitourinary: Negative for breast lump, breast pain, dysuria, frequency, pelvic pressure and vaginal bleeding.   Musculoskeletal: Negative for arthralgias.   Skin: Negative for skin lesions.   Neurological: Negative for dizziness.   Hematological: Does not bruise/bleed easily.   Psychiatric/Behavioral: Positive for sleep disturbance, depressed mood and stress. Negative for suicidal ideas. The patient is nervous/anxious.        Objective   Physical Exam  Vitals reviewed.   Constitutional:       Appearance: Normal appearance.   HENT:      Head: Normocephalic.      Right Ear: Tympanic membrane normal.      Left Ear: Tympanic membrane normal.      Nose: Nose normal.      Mouth/Throat:      Mouth: Mucous membranes are moist.   Eyes:      Pupils: Pupils are equal, round, and reactive to light.   Cardiovascular:      Rate and Rhythm: Normal rate and regular rhythm.      Pulses: Normal pulses.      Heart sounds: Normal heart sounds.   Pulmonary:      Effort: Pulmonary effort is normal.      Breath sounds: Normal breath sounds. No wheezing.   Chest:      Chest wall: No tenderness.   Abdominal:      General: Bowel sounds are normal. There is no distension.      Palpations: Abdomen is soft. There is no mass.      Tenderness: There is no abdominal tenderness. There is no right CVA tenderness, left CVA tenderness, guarding or rebound.      Hernia: No hernia is present.   Musculoskeletal:         General: Normal range of motion.      Cervical back: Normal range of motion.   Skin:     General: Skin is warm.   Neurological:      General: No focal deficit present.      Mental Status: She is alert.   Psychiatric:         Attention and Perception: She does not perceive auditory or visual hallucinations.         Mood and Affect: Mood is anxious and depressed. Affect is flat.         Behavior: Behavior  is cooperative.         Thought Content: Thought content does not include homicidal or suicidal ideation. Thought content does not include homicidal or suicidal plan.         Vitals:    06/09/22 1503   BP: 118/74   Pulse: 91   Resp: 16   SpO2: 97%     Body mass index is 30.45 kg/m².    Procedures    Assessment & Plan   Problems Addressed this Visit    None     Visit Diagnoses     Screening mammogram for breast cancer    -  Primary    Relevant Orders    Mammo Screening Digital Tomosynthesis Bilateral With CAD    PTSD (post-traumatic stress disorder)        Relevant Medications    QUEtiapine (SEROquel) 25 MG tablet    ARIPiprazole (Abilify) 5 MG tablet    FLUoxetine (PROzac) 20 MG tablet    Other Relevant Orders    Ambulatory Referral to Psychiatry    History of hematuria        Relevant Orders    Ambulatory Referral to Urology    POCT urinalysis dipstick, automated (Completed)    Fatty liver due to alcoholism        Relevant Orders    Ambulatory Referral to Gastroenterology    Encounter for screening for metabolic disorder        Relevant Orders    CBC & Differential    Comprehensive metabolic panel    TSH    Screening, lipid        Relevant Orders    Lipid panel    Encounter for hepatitis C virus screening test for high risk patient        Relevant Orders    Hepatitis C Antibody    NELLY (generalized anxiety disorder)        Relevant Medications    QUEtiapine (SEROquel) 25 MG tablet    ARIPiprazole (Abilify) 5 MG tablet    FLUoxetine (PROzac) 20 MG tablet    Moderate recurrent major depression (HCC)        Relevant Medications    QUEtiapine (SEROquel) 25 MG tablet    ARIPiprazole (Abilify) 5 MG tablet    FLUoxetine (PROzac) 20 MG tablet    Tobacco abuse          Diagnoses       Codes Comments    Screening mammogram for breast cancer    -  Primary ICD-10-CM: Z12.31  ICD-9-CM: V76.12     PTSD (post-traumatic stress disorder)     ICD-10-CM: F43.10  ICD-9-CM: 309.81     History of hematuria     ICD-10-CM:  Z87.448  ICD-9-CM: V13.09     Fatty liver due to alcoholism     ICD-10-CM: K70.0  ICD-9-CM: 571.0     Encounter for screening for metabolic disorder     ICD-10-CM: Z13.228  ICD-9-CM: V77.99     Screening, lipid     ICD-10-CM: Z13.220  ICD-9-CM: V77.91     Encounter for hepatitis C virus screening test for high risk patient     ICD-10-CM: Z11.59, Z91.89  ICD-9-CM: V73.89     NELLY (generalized anxiety disorder)     ICD-10-CM: F41.1  ICD-9-CM: 300.02     Moderate recurrent major depression (HCC)     ICD-10-CM: F33.1  ICD-9-CM: 296.32     Tobacco abuse     ICD-10-CM: Z72.0  ICD-9-CM: 305.1       CBC  CMP  TSH  Lipids  Hep C antibody  Urinalysis  Refer for mammogram    PTSD/anxiety/depression-refer to [facility] services.  I called pharmacist and in January 2021 patient was on Seroquel 100 mg a day and Abilify 15 mg a day, no history and [facility] of Prozac.  In ER note patient also mentioned that she had been managed on Prozac.  Patient declines inpatient admission at [facility].  Crisis line information given.  Will restart low-dose Seroquel 25 mg nightly, restart lowest dose Abilify 5 mg and lowest dose Prozac 20 mg.  Patient previously tolerated Seroquel 100 with Abilify 15.  Reviewed side effect profile with patient.  We will touch base with patient in 1 to 2 weeks for dose adjustment and see when she can be seen by [facility] for management.  ER for worsening symptoms or suicidal ideation/homicidal ideation.    History of hematuria-no signs of UTI on urinalysis, refer to urology.  Drink plenty of fluids    Abdominal distention/fatty liver due to alcoholism-referred to GI for work-up, check labs and liver function today    Insomnia- reviewed sleep hygiene, do not abuse or overuse Benadryl due to sedating side effects.  May take over-the-counter melatonin    Tobacco abuse-encourage patient to wean back and pick quit date.  Refill Combivent for COPD use as directed.  Limit triggers.  Call for cough, wheezing,  sputum change or fever.    Follow-up in 6 months and as needed  Send questions or concerns to this provider  Education attached for tobacco abuse, insomnia, fatty liver disease, hematuria, NELLY             Return in about 6 months (around 12/9/2022), or if symptoms worsen or fail to improve.

## 2022-06-10 ENCOUNTER — PATIENT ROUNDING (BHMG ONLY) (OUTPATIENT)
Dept: FAMILY MEDICINE CLINIC | Facility: CLINIC | Age: 52
End: 2022-06-10

## 2022-06-10 DIAGNOSIS — E05.90 HYPERTHYROIDISM: Primary | ICD-10-CM

## 2022-06-10 LAB
ALBUMIN SERPL-MCNC: 4.3 G/DL (ref 3.8–4.9)
ALBUMIN/GLOB SERPL: 1.6 {RATIO} (ref 1.2–2.2)
ALP SERPL-CCNC: 107 IU/L (ref 44–121)
ALT SERPL-CCNC: 25 IU/L (ref 0–32)
AST SERPL-CCNC: 19 IU/L (ref 0–40)
BASOPHILS # BLD AUTO: 0 X10E3/UL (ref 0–0.2)
BASOPHILS NFR BLD AUTO: 1 %
BILIRUB SERPL-MCNC: <0.2 MG/DL (ref 0–1.2)
BUN SERPL-MCNC: 8 MG/DL (ref 6–24)
BUN/CREAT SERPL: 9 (ref 9–23)
CALCIUM SERPL-MCNC: 9.7 MG/DL (ref 8.7–10.2)
CHLORIDE SERPL-SCNC: 105 MMOL/L (ref 96–106)
CHOLEST SERPL-MCNC: 211 MG/DL (ref 100–199)
CO2 SERPL-SCNC: 24 MMOL/L (ref 20–29)
CREAT SERPL-MCNC: 0.91 MG/DL (ref 0.57–1)
EGFRCR SERPLBLD CKD-EPI 2021: 76 ML/MIN/1.73
EOSINOPHIL # BLD AUTO: 0.1 X10E3/UL (ref 0–0.4)
EOSINOPHIL NFR BLD AUTO: 1 %
ERYTHROCYTE [DISTWIDTH] IN BLOOD BY AUTOMATED COUNT: 13.2 % (ref 11.7–15.4)
GLOBULIN SER CALC-MCNC: 2.7 G/DL (ref 1.5–4.5)
GLUCOSE SERPL-MCNC: 90 MG/DL (ref 65–99)
HCT VFR BLD AUTO: 40.4 % (ref 34–46.6)
HCV AB S/CO SERPL IA: <0.1 S/CO RATIO (ref 0–0.9)
HDLC SERPL-MCNC: 44 MG/DL
HGB BLD-MCNC: 13.6 G/DL (ref 11.1–15.9)
IMM GRANULOCYTES # BLD AUTO: 0 X10E3/UL (ref 0–0.1)
IMM GRANULOCYTES NFR BLD AUTO: 0 %
LDLC SERPL CALC-MCNC: 142 MG/DL (ref 0–99)
LYMPHOCYTES # BLD AUTO: 2 X10E3/UL (ref 0.7–3.1)
LYMPHOCYTES NFR BLD AUTO: 28 %
MCH RBC QN AUTO: 31 PG (ref 26.6–33)
MCHC RBC AUTO-ENTMCNC: 33.7 G/DL (ref 31.5–35.7)
MCV RBC AUTO: 92 FL (ref 79–97)
MONOCYTES # BLD AUTO: 0.4 X10E3/UL (ref 0.1–0.9)
MONOCYTES NFR BLD AUTO: 6 %
NEUTROPHILS # BLD AUTO: 4.4 X10E3/UL (ref 1.4–7)
NEUTROPHILS NFR BLD AUTO: 64 %
PLATELET # BLD AUTO: 457 X10E3/UL (ref 150–450)
POTASSIUM SERPL-SCNC: 4.2 MMOL/L (ref 3.5–5.2)
PROT SERPL-MCNC: 7 G/DL (ref 6–8.5)
RBC # BLD AUTO: 4.39 X10E6/UL (ref 3.77–5.28)
SODIUM SERPL-SCNC: 142 MMOL/L (ref 134–144)
TRIGL SERPL-MCNC: 137 MG/DL (ref 0–149)
TSH SERPL DL<=0.005 MIU/L-ACNC: 0.29 UIU/ML (ref 0.45–4.5)
VLDLC SERPL CALC-MCNC: 25 MG/DL (ref 5–40)
WBC # BLD AUTO: 6.9 X10E3/UL (ref 3.4–10.8)

## 2022-06-10 NOTE — PROGRESS NOTES
A [email or web address] message has been sent to the patient for Patient Rounding with [facility]

## 2022-06-17 ENCOUNTER — APPOINTMENT (OUTPATIENT)
Dept: MAMMOGRAPHY | Facility: HOSPITAL | Age: 52
End: 2022-06-17

## 2022-06-28 ENCOUNTER — TELEPHONE (OUTPATIENT)
Dept: FAMILY MEDICINE CLINIC | Facility: CLINIC | Age: 52
End: 2022-06-28

## 2022-06-28 NOTE — TELEPHONE ENCOUNTER
Caller: Emily Garcia    Relationship: Self    Best call back number: [phone]    What medication are you requesting: -ARIPiprazole (Abilify) 5 MG tablet CHANGE TO 15MG   -CHROMANOL 20MG  -QUEtiapine (SEROquel) 25 MG tablet CHANGE TO 100MG  -MIRTAZAPINE 15MG     What are your current symptoms: THESE ARE OLD DOSAGES SHE WOULD LIKE TO RECIEVE AGAIN     How long have you been experiencing symptoms: N/A    Have you had these symptoms before:    [x] Yes  [] No    Have you been treated for these symptoms before:   [x] Yes  [] No    If a prescription is needed, what is your preferred pharmacy and phone number: [facility] [id number] [place] [address] AT [facility] [address] & [address] - [phone]  - [phone] FX     Additional notes: PATIENT WAS IN [facility] IN [place] WHEN THESE WERE PRESCRIBED AT THE DOSAGES PATIENT IS REQUESTING ABOVE

## 2022-06-29 RX ORDER — ARIPIPRAZOLE 10 MG/1
10 TABLET ORAL DAILY
Qty: 30 TABLET | Refills: 3 | Status: SHIPPED | OUTPATIENT
Start: 2022-06-29 | End: 2022-07-29

## 2022-06-29 RX ORDER — QUETIAPINE FUMARATE 50 MG/1
50 TABLET, FILM COATED ORAL NIGHTLY
Qty: 30 TABLET | Refills: 3 | Status: SHIPPED | OUTPATIENT
Start: 2022-06-29 | End: 2022-10-07 | Stop reason: SDUPTHER

## 2022-07-12 PROBLEM — R79.89 ABNORMAL TSH: Status: ACTIVE | Noted: 2022-07-12

## 2022-08-03 RX ORDER — ARIPIPRAZOLE 5 MG/1
5 TABLET ORAL DAILY
Qty: 30 TABLET | Refills: 1 | OUTPATIENT
Start: 2022-08-03

## 2022-08-03 RX ORDER — QUETIAPINE FUMARATE 25 MG/1
25 TABLET, FILM COATED ORAL NIGHTLY
Qty: 30 TABLET | Refills: 2 | OUTPATIENT
Start: 2022-08-03

## 2022-08-03 RX ORDER — FLUOXETINE 20 MG/1
20 TABLET, FILM COATED ORAL DAILY
Qty: 30 TABLET | Refills: 5 | Status: SHIPPED | OUTPATIENT
Start: 2022-08-03

## 2022-08-03 NOTE — TELEPHONE ENCOUNTER
Denied because these aren't the right dosages of medications.    Pt was sent refills at the end June for the new doses as she requested

## 2022-10-03 NOTE — PROGRESS NOTES
Subjective   Emily Garcia is a 52 y.o. female.     History of Present Illness  New pt ref by dr Sangeetha Arteaga for hyperthyroidism      Patient is a 52-year-old female who was referred for low TSH found on examination when she was being evaluated for depression in 2022.  She was started on Prozac, Abilify and Seroquel at that time.      She has no previous history of thyroid disease.  She denies palpitations.  She occasionally feels warmer.  She denies increase in bowel frequency.  She has occasional tremors.  She denies neck soreness.  She is sleeping well.  She denies hair, skin or nail changes.  She has gained weight last year but has lost 13 pounds since 2022.    She has hyperlipidemia and is not on medications.  She has no history of diabetes mellitus.  Her last meal was at 7:30 AM.    She is  4 para 3.  She had a hysterectomy in 2016 for fibroid disease.  She was never on hormone replacement therapy.  She has not had any recent mammogram or gynecologic examination.    She has history of COPD and is on Combivent.  She smokes 1 pack of cigarettes per day and has been smoking for 30 years.  She had benign polyps removed    She had benign polyps removed in 2018.  She denies bowel complaints.  She has no family history of colon cancer.     She has history of microscopic hematuria for several years but has not had a urologic evaluation.  CT scan of the abdomen done in 2022 showed a 2.9 cm left ovarian cyst and left renal cysts.      The following portions of the patient's history were reviewed and updated as appropriate: allergies, current medications, past family history, past medical history, past social history, past surgical history and problem list.    Review of Systems   Eyes: Negative for visual disturbance.   Respiratory: Negative for shortness of breath.    Cardiovascular: Negative for chest pain and palpitations.   Gastrointestinal: Negative.    Endocrine: Negative for cold  "intolerance and heat intolerance.   Genitourinary: Positive for hematuria.   Musculoskeletal: Negative for myalgias.   Neurological: Negative for numbness.     Vitals:    10/04/22 0809   BP: 122/74   Pulse: 74   Temp: 97.1 °F (36.2 °C)   TempSrc: Temporal   SpO2: 100%   Weight: 88.9 kg (196 lb)   Height: 165.1 cm (65\")      Objective   Physical Exam  Constitutional:       General: She is not in acute distress.     Appearance: Normal appearance. She is not ill-appearing, toxic-appearing or diaphoretic.   Eyes:      General: No scleral icterus.        Right eye: No discharge.         Left eye: No discharge.      Extraocular Movements: Extraocular movements intact.      Conjunctiva/sclera: Conjunctivae normal.      Comments: No exophthalmos or lid lag.   Neck:      Vascular: No carotid bruit.   Cardiovascular:      Rate and Rhythm: Normal rate and regular rhythm.      Pulses: Normal pulses.      Heart sounds: Normal heart sounds. No murmur heard.    No friction rub.   Pulmonary:      Effort: Pulmonary effort is normal. No respiratory distress.      Breath sounds: Normal breath sounds. No stridor. No rales.   Chest:      Chest wall: No tenderness.   Abdominal:      General: Bowel sounds are normal. There is no distension.      Palpations: Abdomen is soft. There is no mass.      Tenderness: There is no right CVA tenderness or left CVA tenderness.   Musculoskeletal:      Cervical back: No tenderness.      Right lower leg: No edema.      Left lower leg: No edema.   Lymphadenopathy:      Cervical: No cervical adenopathy.   Skin:     General: Skin is warm and dry.   Neurological:      General: No focal deficit present.      Mental Status: She is alert and oriented to person, place, and time.   Psychiatric:         Mood and Affect: Mood normal.         Behavior: Behavior normal.       Office Visit on 06/09/2022   Component Date Value Ref Range Status   • WBC 06/09/2022 6.9  3.4 - 10.8 x10E3/uL Final   • RBC 06/09/2022 4.39  " 3.77 - 5.28 x10E6/uL Final   • Hemoglobin 06/09/2022 13.6  11.1 - 15.9 g/dL Final   • Hematocrit 06/09/2022 40.4  34.0 - 46.6 % Final   • MCV 06/09/2022 92  79 - 97 fL Final   • MCH 06/09/2022 31.0  26.6 - 33.0 pg Final   • MCHC 06/09/2022 33.7  31.5 - 35.7 g/dL Final   • RDW 06/09/2022 13.2  11.7 - 15.4 % Final   • Platelets 06/09/2022 457 (A) 150 - 450 x10E3/uL Final   • Neutrophil Rel % 06/09/2022 64  Not Estab. % Final   • Lymphocyte Rel % 06/09/2022 28  Not Estab. % Final   • Monocyte Rel % 06/09/2022 6  Not Estab. % Final   • Eosinophil Rel % 06/09/2022 1  Not Estab. % Final   • Basophil Rel % 06/09/2022 1  Not Estab. % Final   • Neutrophils Absolute 06/09/2022 4.4  1.4 - 7.0 x10E3/uL Final   • Lymphocytes Absolute 06/09/2022 2.0  0.7 - 3.1 x10E3/uL Final   • Monocytes Absolute 06/09/2022 0.4  0.1 - 0.9 x10E3/uL Final   • Eosinophils Absolute 06/09/2022 0.1  0.0 - 0.4 x10E3/uL Final   • Basophils Absolute 06/09/2022 0.0  0.0 - 0.2 x10E3/uL Final   • Immature Granulocyte Rel % 06/09/2022 0  Not Estab. % Final   • Immature Grans Absolute 06/09/2022 0.0  0.0 - 0.1 x10E3/uL Final   • Glucose 06/09/2022 90  65 - 99 mg/dL Final   • BUN 06/09/2022 8  6 - 24 mg/dL Final   • Creatinine 06/09/2022 0.91  0.57 - 1.00 mg/dL Final   • EGFR Result 06/09/2022 76  >59 mL/min/1.73 Final   • BUN/Creatinine Ratio 06/09/2022 9  9 - 23 Final   • Sodium 06/09/2022 142  134 - 144 mmol/L Final   • Potassium 06/09/2022 4.2  3.5 - 5.2 mmol/L Final   • Chloride 06/09/2022 105  96 - 106 mmol/L Final   • Total CO2 06/09/2022 24  20 - 29 mmol/L Final   • Calcium 06/09/2022 9.7  8.7 - 10.2 mg/dL Final   • Total Protein 06/09/2022 7.0  6.0 - 8.5 g/dL Final   • Albumin 06/09/2022 4.3  3.8 - 4.9 g/dL Final   • Globulin 06/09/2022 2.7  1.5 - 4.5 g/dL Final   • A/G Ratio 06/09/2022 1.6  1.2 - 2.2 Final   • Total Bilirubin 06/09/2022 <0.2  0.0 - 1.2 mg/dL Final   • Alkaline Phosphatase 06/09/2022 107  44 - 121 IU/L Final   • AST (SGOT) 06/09/2022  19  0 - 40 IU/L Final   • ALT (SGPT) 06/09/2022 25  0 - 32 IU/L Final   • Hep C Virus Ab 06/09/2022 <0.1  0.0 - 0.9 s/co ratio Final    Comment:                                   Negative:     < 0.8                               Indeterminate: 0.8 - 0.9                                    Positive:     > 0.9   HCV antibody alone does not differentiate between   previous resolved infection and active infection.   The CDC and current clinical guidelines recommend   that a positive HCV antibody result be followed up   with an HCV RNA test to support the diagnosis of   acute HCV infection. [facility] offers Hepatitis C   Virus (HCV) RNA, Diagnosis, DANIELLE (027485) and   Hepatitis C Virus (HCV) Antibody with reflex to   Quantitative Real-time PCR (669616).     • TSH 06/09/2022 0.287 (A) 0.450 - 4.500 uIU/mL Final   • Total Cholesterol 06/09/2022 211 (A) 100 - 199 mg/dL Final   • Triglycerides 06/09/2022 137  0 - 149 mg/dL Final   • HDL Cholesterol 06/09/2022 44  >39 mg/dL Final   • VLDL Cholesterol Ramiro 06/09/2022 25  5 - 40 mg/dL Final   • LDL Chol Calc (NIH) 06/09/2022 142 (A) 0 - 99 mg/dL Final   • Color 06/09/2022 Yellow  Yellow, Straw, Dark Yellow, Margret Final   • Clarity, UA 06/09/2022 Clear  Clear Final   • Specific Gravity  06/09/2022 1.015 (A) 1.005 - 1.030 Final   • pH, Urine 06/09/2022 6.0  5.0 - 8.0 Final   • Leukocytes 06/09/2022 Negative  Negative Final   • Nitrite, UA 06/09/2022 Negative  Negative Final   • Protein, POC 06/09/2022 Negative  Negative mg/dL Final   • Glucose, UA 06/09/2022 Negative  Negative, 1000 mg/dL (3+) mg/dL Final   • Ketones, UA 06/09/2022 Negative  Negative Final   • Urobilinogen, UA 06/09/2022 Normal  Normal Final   • Bilirubin 06/09/2022 1 mg/dL (A) Negative Final   • Blood, UA 06/09/2022 Moderate (A) Negative Final   • Lot Number 06/09/2022 [id number]   Final   • Expiration Date 06/09/2022 05/31/2023   Final     Assessment & Plan   Diagnoses and all orders for this visit:    1. Abnormal TSH  (Primary)  -     TSH  -     T4, Free  -     T3, Free  -     Thyroid Peroxidase Antibody  -     Thyroid Stimulating Immunoglobulin    2. Anxiety and depression    3. Hyperlipidemia, unspecified hyperlipidemia type  -     Lipid Panel  -     Comprehensive Metabolic Panel    4. Chronic obstructive pulmonary disease, unspecified COPD type (HCC)    5. Current tobacco use    6. History of colon polyps    7. Abnormal results of thyroid function studies   -     TSH  -     T4, Free      Patient is clinically euthyroid.  Her low TSH may be related to depression at the time the blood was drawn.  Check thyroid function test, thyroid peroxidase antibody, and thyroid-stimulating immunoglobulin.    Check lipid panel.    Patient has history of benign polyps.  Suggest follow-up colonoscopy.    Advised to discuss with PCP about getting psychiatric follow-up.    Copy of my note sent to Sangeetha Arteaga.     Patient will call for lab results and further instructions.

## 2022-10-04 ENCOUNTER — OFFICE VISIT (OUTPATIENT)
Dept: ENDOCRINOLOGY | Age: 52
End: 2022-10-04

## 2022-10-04 VITALS
HEIGHT: 65 IN | BODY MASS INDEX: 32.65 KG/M2 | WEIGHT: 196 LBS | TEMPERATURE: 97.1 F | OXYGEN SATURATION: 100 % | SYSTOLIC BLOOD PRESSURE: 122 MMHG | DIASTOLIC BLOOD PRESSURE: 74 MMHG | HEART RATE: 74 BPM

## 2022-10-04 DIAGNOSIS — R94.6 ABNORMAL RESULTS OF THYROID FUNCTION STUDIES: ICD-10-CM

## 2022-10-04 DIAGNOSIS — F41.9 ANXIETY AND DEPRESSION: ICD-10-CM

## 2022-10-04 DIAGNOSIS — J44.9 CHRONIC OBSTRUCTIVE PULMONARY DISEASE, UNSPECIFIED COPD TYPE: ICD-10-CM

## 2022-10-04 DIAGNOSIS — E78.5 HYPERLIPIDEMIA, UNSPECIFIED HYPERLIPIDEMIA TYPE: ICD-10-CM

## 2022-10-04 DIAGNOSIS — Z72.0 CURRENT TOBACCO USE: ICD-10-CM

## 2022-10-04 DIAGNOSIS — F32.A ANXIETY AND DEPRESSION: ICD-10-CM

## 2022-10-04 DIAGNOSIS — Z86.010 HISTORY OF COLON POLYPS: ICD-10-CM

## 2022-10-04 DIAGNOSIS — R79.89 ABNORMAL TSH: Primary | ICD-10-CM

## 2022-10-04 PROCEDURE — 99214 OFFICE O/P EST MOD 30 MIN: CPT | Performed by: INTERNAL MEDICINE

## 2022-10-04 RX ORDER — ARIPIPRAZOLE 10 MG/1
TABLET ORAL
COMMUNITY
Start: 2022-09-30 | End: 2023-01-09 | Stop reason: SDUPTHER

## 2022-10-05 LAB
ALBUMIN SERPL-MCNC: 4.5 G/DL (ref 3.5–5.2)
ALBUMIN/GLOB SERPL: 2.4 G/DL
ALP SERPL-CCNC: 114 U/L (ref 39–117)
ALT SERPL-CCNC: 13 U/L (ref 1–33)
AST SERPL-CCNC: 17 U/L (ref 1–32)
BILIRUB SERPL-MCNC: <0.2 MG/DL (ref 0–1.2)
BUN SERPL-MCNC: 12 MG/DL (ref 6–20)
BUN/CREAT SERPL: 16.9 (ref 7–25)
CALCIUM SERPL-MCNC: 9.6 MG/DL (ref 8.6–10.5)
CHLORIDE SERPL-SCNC: 104 MMOL/L (ref 98–107)
CHOLEST SERPL-MCNC: 284 MG/DL (ref 0–200)
CO2 SERPL-SCNC: 25 MMOL/L (ref 22–29)
CREAT SERPL-MCNC: 0.71 MG/DL (ref 0.57–1)
EGFRCR SERPLBLD CKD-EPI 2021: 101.8 ML/MIN/1.73
GLOBULIN SER CALC-MCNC: 1.9 GM/DL
GLUCOSE SERPL-MCNC: 95 MG/DL (ref 65–99)
HDLC SERPL-MCNC: 66 MG/DL (ref 40–60)
IMP & REVIEW OF LAB RESULTS: NORMAL
LDLC SERPL CALC-MCNC: 149 MG/DL (ref 0–100)
POTASSIUM SERPL-SCNC: 3.9 MMOL/L (ref 3.5–5.2)
PROT SERPL-MCNC: 6.4 G/DL (ref 6–8.5)
SODIUM SERPL-SCNC: 142 MMOL/L (ref 136–145)
T3FREE SERPL-MCNC: 3 PG/ML (ref 2–4.4)
T4 FREE SERPL-MCNC: 0.88 NG/DL (ref 0.93–1.7)
THYROPEROXIDASE AB SERPL-ACNC: 8 IU/ML (ref 0–34)
TRIGL SERPL-MCNC: 374 MG/DL (ref 0–150)
TSH SERPL DL<=0.005 MIU/L-ACNC: 2.18 UIU/ML (ref 0.27–4.2)
TSI SER-ACNC: <0.1 IU/L (ref 0–0.55)
VLDLC SERPL CALC-MCNC: 69 MG/DL (ref 5–40)

## 2022-10-06 ENCOUNTER — PATIENT ROUNDING (BHMG ONLY) (OUTPATIENT)
Dept: ENDOCRINOLOGY | Age: 52
End: 2022-10-06

## 2022-10-07 RX ORDER — QUETIAPINE FUMARATE 50 MG/1
50 TABLET, FILM COATED ORAL NIGHTLY
Qty: 30 TABLET | Refills: 3 | Status: SHIPPED | OUTPATIENT
Start: 2022-10-07 | End: 2022-12-09 | Stop reason: SDUPTHER

## 2022-10-07 NOTE — TELEPHONE ENCOUNTER
Caller: Emily Garcia    Relationship: Self    Best call back number: [phone]  Requested Prescriptions:   Requested Prescriptions     Pending Prescriptions Disp Refills   • QUEtiapine (SEROquel) 50 MG tablet 30 tablet 3     Sig: Take 1 tablet by mouth Every Night for 30 days.        Pharmacy where request should be sent: [id number] - [place] [address] [facility] [address] - [phone]  - [phone] FX     Additional details provided by patient: OUT    Does the patient have less than a 3 day supply:  [x] Yes  [] No    Hakan Bryant   10/07/22 09:57 EDT

## 2022-10-09 DIAGNOSIS — E78.5 HYPERLIPIDEMIA, UNSPECIFIED HYPERLIPIDEMIA TYPE: ICD-10-CM

## 2022-10-09 DIAGNOSIS — R79.89 ABNORMAL TSH: Primary | ICD-10-CM

## 2022-10-09 DIAGNOSIS — R94.6 ABNORMAL RESULTS OF THYROID FUNCTION STUDIES: ICD-10-CM

## 2022-10-09 NOTE — PROGRESS NOTES
Normal TSH.  Free T4 slightly low at 0.88 ng per DL.  Normal free T3 at 3.0 pg/mL.  Normal thyroid function test.  Repeat free T4, free T3 and TSH in 6 weeks.  Orders placed in chart.  Normal thyroid peroxidase antibody.  Normal thyroid-stimulating immunoglobulin.  Cholesterol higher at 284 mg per DL.  HDL 66.  .  Triglycerides 374.  10-year risk of heart disease or stroke using the American Heart Association calculator is 4.6%.  Reduce dietary fat and discontinue smoking to reduce risk further.  Copy of labs sent to Sangeetha Arteaga NP.  Schedule follow-up in 6 months.

## 2022-11-01 RX ORDER — ARIPIPRAZOLE 10 MG/1
TABLET ORAL
Qty: 30 TABLET | OUTPATIENT
Start: 2022-11-01

## 2022-11-01 RX ORDER — QUETIAPINE FUMARATE 50 MG/1
50 TABLET, FILM COATED ORAL NIGHTLY
Qty: 30 TABLET | Refills: 3 | OUTPATIENT
Start: 2022-11-01

## 2022-12-01 RX ORDER — IPRATROPIUM/ALBUTEROL SULFATE 20-100 MCG
MIST INHALER (GRAM) INHALATION
Qty: 4 G | Refills: 6 | Status: SHIPPED | OUTPATIENT
Start: 2022-12-01

## 2022-12-09 ENCOUNTER — OFFICE VISIT (OUTPATIENT)
Dept: FAMILY MEDICINE CLINIC | Facility: CLINIC | Age: 52
End: 2022-12-09
Payer: MEDICARE

## 2022-12-09 VITALS
DIASTOLIC BLOOD PRESSURE: 76 MMHG | HEIGHT: 65 IN | OXYGEN SATURATION: 97 % | TEMPERATURE: 97.6 F | WEIGHT: 203 LBS | HEART RATE: 80 BPM | BODY MASS INDEX: 33.82 KG/M2 | SYSTOLIC BLOOD PRESSURE: 128 MMHG | RESPIRATION RATE: 16 BRPM

## 2022-12-09 DIAGNOSIS — Z00.00 ENCOUNTER FOR SUBSEQUENT ANNUAL WELLNESS VISIT IN MEDICARE PATIENT: Primary | ICD-10-CM

## 2022-12-09 PROBLEM — F32.A DEPRESSION: Status: ACTIVE | Noted: 2022-12-09

## 2022-12-09 PROBLEM — F43.10 PTSD (POST-TRAUMATIC STRESS DISORDER): Status: ACTIVE | Noted: 2022-12-09

## 2022-12-09 PROBLEM — F41.9 ANXIETY: Status: ACTIVE | Noted: 2022-12-09

## 2022-12-09 PROBLEM — F42.9 OCD (OBSESSIVE COMPULSIVE DISORDER): Status: ACTIVE | Noted: 2022-12-09

## 2022-12-09 PROBLEM — J44.9 COPD (CHRONIC OBSTRUCTIVE PULMONARY DISEASE): Status: ACTIVE | Noted: 2022-12-09

## 2022-12-09 PROCEDURE — 99396 PREV VISIT EST AGE 40-64: CPT | Performed by: STUDENT IN AN ORGANIZED HEALTH CARE EDUCATION/TRAINING PROGRAM

## 2022-12-09 RX ORDER — QUETIAPINE FUMARATE 50 MG/1
50 TABLET, FILM COATED ORAL NIGHTLY
Qty: 30 TABLET | Refills: 3 | Status: SHIPPED | OUTPATIENT
Start: 2022-12-09 | End: 2023-01-09 | Stop reason: SDUPTHER

## 2022-12-09 RX ORDER — FUROSEMIDE 20 MG/1
10 TABLET ORAL DAILY
Qty: 15 TABLET | Refills: 0 | Status: SHIPPED | OUTPATIENT
Start: 2022-12-09 | End: 2023-04-06

## 2022-12-09 NOTE — PROGRESS NOTES
The ABCs of the Annual Wellness Visit  Subsequent Medicare Wellness Visit    Chief Complaint   Patient presents with   • Medicare Wellness-subsequent      Subjective    History of Present Illness:  Emily Garcia is a 52 y.o. female who presents for a Subsequent Medicare Wellness Visit.    Currently disability 100% for depression and anxiety.      Needing to have a pap.      The following portions of the patient's history were reviewed and   updated as appropriate: allergies, current medications, past family history, past medical history, past social history, past surgical history and problem list.    Compared to one year ago, the patient feels her physical   health is the same.    Compared to one year ago, the patient feels her mental   health is the same.    Recent Hospitalizations:  She was not admitted to the hospital during the last year.     Used to be a CNA for 25 years  Current Medical Providers:  Patient Care Team:  Andrae Shore DO as PCP - General (Family Medicine)    Outpatient Medications Prior to Visit   Medication Sig Dispense Refill   • Combivent Respimat  MCG/ACT inhaler INHALE 1 PUFF BY MOUTH FOUR TIMES DAILY AS NEEDED FOR WHEEZING 4 g 6   • FLUoxetine (PROzac) 20 MG tablet TAKE 1 TABLET BY MOUTH DAILY 30 tablet 5   • ARIPiprazole (ABILIFY) 10 MG tablet      • QUEtiapine (SEROquel) 50 MG tablet Take 1 tablet by mouth Every Night. 30 tablet 3     No facility-administered medications prior to visit.       No opioid medication identified on active medication list. I have reviewed chart for other potential  high risk medication/s and harmful drug interactions in the elderly.          Aspirin is not on active medication list.  Aspirin use is not indicated based on review of current medical condition/s. Risk of harm outweighs potential benefits.  .    Patient Active Problem List   Diagnosis   • Abnormal TSH   • Anxiety   • COPD (chronic obstructive pulmonary disease) (HCC)   • Depression   • OCD  "(obsessive compulsive disorder)   • PTSD (post-traumatic stress disorder)     Advance Care Planning  Advance Directive is not on file.  ACP discussion was held with the patient during this visit. Patient does not have an advance directive, information provided.   Needing paperwork completed and will provide today          Objective    Vitals:    22 1351   BP: 128/76   BP Location: Right arm   Patient Position: Sitting   Cuff Size: Large Adult   Pulse: 80   Resp: 16   Temp: 97.6 °F (36.4 °C)   SpO2: 97%   Weight: 92.1 kg (203 lb)   Height: 165.1 cm (65\")     Estimated body mass index is 33.78 kg/m² as calculated from the following:    Height as of this encounter: 165.1 cm (65\").    Weight as of this encounter: 92.1 kg (203 lb).    BMI is >= 30 and <35. (Class 1 Obesity). The following options were offered after discussion;: exercise counseling/recommendations and nutrition counseling/recommendations      Does the patient have evidence of cognitive impairment? No    Physical Exam  Vitals reviewed.   Constitutional:       General: She is not in acute distress.     Appearance: She is not ill-appearing.   Cardiovascular:      Rate and Rhythm: Normal rate and regular rhythm.      Heart sounds: Normal heart sounds. No murmur heard.  Pulmonary:      Effort: Pulmonary effort is normal.      Breath sounds: Normal breath sounds. No wheezing, rhonchi or rales.   Musculoskeletal:      Right lower le+ Pitting Edema present.      Left lower le+ Pitting Edema present.   Neurological:      Mental Status: She is alert.   Psychiatric:         Mood and Affect: Mood normal.         Behavior: Behavior normal.         Thought Content: Thought content normal.         Judgment: Judgment normal.                 HEALTH RISK ASSESSMENT    Smoking Status:  Social History     Tobacco Use   Smoking Status Every Day   • Packs/day: 1.00   • Types: Cigarettes   Smokeless Tobacco Not on file     Alcohol Consumption:  Social History "     Substance and Sexual Activity   Alcohol Use Yes    Comment: quit alcohol in march     Fall Risk Screen:    AROLDO Fall Risk Assessment has not been completed.    Depression Screening:  PHQ-2/PHQ-9 Depression Screening 1/9/2023   Little Interest or Pleasure in Doing Things 1-->several days   Feeling Down, Depressed or Hopeless 0-->not at all   Trouble Falling or Staying Asleep, or Sleeping Too Much 1-->several days   Feeling Tired or Having Little Energy 1-->several days   Poor Appetite or Overeating 1-->several days   Feeling Bad about Yourself - or that You are a Failure or Have Let Yourself or Your Family Down 0-->not at all   Trouble Concentrating on Things, Such as Reading the Newspaper or Watching Television 0-->not at all   Moving or Speaking So Slowly that Other People Could Have Noticed? Or the Opposite - Being So Fidgety 0-->not at all   Thoughts that You Would be Better Off Dead or of Hurting Yourself in Some Way 0-->not at all   PHQ-9: Brief Depression Severity Measure Score 4   If You Checked Off Any Problems, How Difficult Have These Problems Made It For You to Do Your Work, Take Care of Things at Home, or Get Along with Other People? somewhat difficult       Health Habits and Functional and Cognitive Screening:  Functional & Cognitive Status 12/9/2022   Do you have difficulty preparing food and eating? No   Do you have difficulty bathing yourself, getting dressed or grooming yourself? No   Do you have difficulty using the toilet? No   Do you have difficulty moving around from place to place? No   Do you have trouble with steps or getting out of a bed or a chair? No   Current Diet Unhealthy Diet   Dental Exam Up to date   Eye Exam Up to date   Exercise (times per week) 0 times per week   Do you need help using the phone?  No   Are you deaf or do you have serious difficulty hearing?  No   Do you need help with transportation? No   Do you need help shopping? No   Do you need help preparing meals?  No    Do you need help with housework?  No   Do you need help with laundry? No   Do you need help taking your medications? No   Do you need help managing money? No   Do you ever drive or ride in a car without wearing a seat belt? No       Age-appropriate Screening Schedule:  Refer to the list below for future screening recommendations based on patient's age, sex and/or medical conditions. Orders for these recommended tests are listed in the plan section. The patient has been provided with a written plan.    Health Maintenance   Topic Date Due   • MAMMOGRAM  Never done   • ZOSTER VACCINE (1 of 2) Never done   • TDAP/TD VACCINES (2 - Td or Tdap) 05/28/2022   • INFLUENZA VACCINE  08/01/2022   • LIPID PANEL  10/04/2023              Assessment & Plan   CMS Preventative Services Quick Reference  Risk Factors Identified During Encounter  Depression/Dysphoria: Current medication is effective, no change recommended  The above risks/problems have been discussed with the patient.  Follow up actions/plans if indicated are seen below in the Assessment/Plan Section.  Pertinent information has been shared with the patient in the After Visit Summary.    Diagnoses and all orders for this visit:    1. Encounter for subsequent annual wellness visit in Medicare patient (Primary)    Other orders  -     Discontinue: QUEtiapine (SEROquel) 50 MG tablet; Take 1 tablet by mouth Every Night.  Dispense: 30 tablet; Refill: 3  -     furosemide (Lasix) 20 MG tablet; Take 0.5 tablets by mouth Daily.  Dispense: 15 tablet; Refill: 0    -Patient medication list up-to-date.  Medication changes are up-to-date in patient chart.    Follow Up:   Return in about 1 month (around 1/9/2023) for Wt and mental health medications. .     An After Visit Summary and PPPS were made available to the patient.

## 2023-01-09 ENCOUNTER — OFFICE VISIT (OUTPATIENT)
Dept: FAMILY MEDICINE CLINIC | Facility: CLINIC | Age: 53
End: 2023-01-09
Payer: MEDICARE

## 2023-01-09 VITALS
WEIGHT: 208 LBS | DIASTOLIC BLOOD PRESSURE: 78 MMHG | SYSTOLIC BLOOD PRESSURE: 122 MMHG | BODY MASS INDEX: 34.66 KG/M2 | HEIGHT: 65 IN | HEART RATE: 94 BPM | OXYGEN SATURATION: 96 % | TEMPERATURE: 98.2 F

## 2023-01-09 DIAGNOSIS — F43.10 PTSD (POST-TRAUMATIC STRESS DISORDER): Primary | ICD-10-CM

## 2023-01-09 DIAGNOSIS — F33.1 MODERATE RECURRENT MAJOR DEPRESSION: ICD-10-CM

## 2023-01-09 PROCEDURE — 99213 OFFICE O/P EST LOW 20 MIN: CPT | Performed by: STUDENT IN AN ORGANIZED HEALTH CARE EDUCATION/TRAINING PROGRAM

## 2023-01-09 RX ORDER — ARIPIPRAZOLE 5 MG/1
5 TABLET ORAL DAILY
Qty: 30 TABLET | Refills: 1 | Status: SHIPPED | OUTPATIENT
Start: 2023-01-09 | End: 2023-03-08

## 2023-01-09 RX ORDER — QUETIAPINE FUMARATE 100 MG/1
100 TABLET, FILM COATED ORAL NIGHTLY
Qty: 30 TABLET | Refills: 1 | Status: SHIPPED | OUTPATIENT
Start: 2023-01-09 | End: 2023-03-08

## 2023-03-08 DIAGNOSIS — F43.10 PTSD (POST-TRAUMATIC STRESS DISORDER): ICD-10-CM

## 2023-03-08 DIAGNOSIS — F33.1 MODERATE RECURRENT MAJOR DEPRESSION: ICD-10-CM

## 2023-03-08 RX ORDER — QUETIAPINE FUMARATE 100 MG/1
100 TABLET, FILM COATED ORAL NIGHTLY
Qty: 30 TABLET | Refills: 1 | Status: SHIPPED | OUTPATIENT
Start: 2023-03-08 | End: 2023-03-21 | Stop reason: SDUPTHER

## 2023-03-08 RX ORDER — ARIPIPRAZOLE 5 MG/1
5 TABLET ORAL DAILY
Qty: 30 TABLET | Refills: 1 | Status: SHIPPED | OUTPATIENT
Start: 2023-03-08

## 2023-03-09 ENCOUNTER — OFFICE VISIT (OUTPATIENT)
Dept: FAMILY MEDICINE CLINIC | Facility: CLINIC | Age: 53
End: 2023-03-09
Payer: MEDICARE

## 2023-03-09 VITALS
RESPIRATION RATE: 16 BRPM | HEIGHT: 65 IN | SYSTOLIC BLOOD PRESSURE: 114 MMHG | TEMPERATURE: 96.4 F | DIASTOLIC BLOOD PRESSURE: 76 MMHG | HEART RATE: 92 BPM | OXYGEN SATURATION: 96 % | BODY MASS INDEX: 34.66 KG/M2 | WEIGHT: 208 LBS

## 2023-03-09 DIAGNOSIS — E66.09 CLASS 2 OBESITY DUE TO EXCESS CALORIES WITHOUT SERIOUS COMORBIDITY WITH BODY MASS INDEX (BMI) OF 35.0 TO 35.9 IN ADULT: ICD-10-CM

## 2023-03-09 DIAGNOSIS — M48.062 SPINAL STENOSIS OF LUMBAR REGION WITH NEUROGENIC CLAUDICATION: ICD-10-CM

## 2023-03-09 DIAGNOSIS — E78.00 HYPERCHOLESTEROLEMIA: Primary | ICD-10-CM

## 2023-03-09 DIAGNOSIS — M51.36 DDD (DEGENERATIVE DISC DISEASE), LUMBAR: ICD-10-CM

## 2023-03-09 PROBLEM — E66.812 CLASS 2 OBESITY DUE TO EXCESS CALORIES WITHOUT SERIOUS COMORBIDITY WITH BODY MASS INDEX (BMI) OF 35.0 TO 35.9 IN ADULT: Status: ACTIVE | Noted: 2023-03-09

## 2023-03-09 PROBLEM — M51.369 DDD (DEGENERATIVE DISC DISEASE), LUMBAR: Status: ACTIVE | Noted: 2023-03-09

## 2023-03-09 PROCEDURE — 99214 OFFICE O/P EST MOD 30 MIN: CPT | Performed by: STUDENT IN AN ORGANIZED HEALTH CARE EDUCATION/TRAINING PROGRAM

## 2023-03-09 RX ORDER — ATORVASTATIN CALCIUM 10 MG/1
10 TABLET, FILM COATED ORAL DAILY
Qty: 90 TABLET | Refills: 1 | Status: SHIPPED | OUTPATIENT
Start: 2023-03-09

## 2023-03-09 RX ORDER — SEMAGLUTIDE 1 MG/.5ML
1 INJECTION, SOLUTION SUBCUTANEOUS WEEKLY
Qty: 2 ML | Refills: 0 | Status: SHIPPED | OUTPATIENT
Start: 2023-03-09 | End: 2023-04-06

## 2023-03-09 NOTE — PROGRESS NOTES
Andrae Shore,   [facility]  [address]  [place] [address] [phone]    Subjective      Name Emily Garcia MRN [id number]    1970 AGE/SEX 52 y.o. / female      Chief Complaint Chief Complaint   Patient presents with   • Follow-up   • Insomnia          • Depression         Visit History for  2023    History of Present Illness  Emily Garcia is a 52 y.o. female who presented today for Follow-up, Insomnia (/), and Depression  Overall she states she has improved with medications.  Her mood is greatly improved and her sleep as well.  She continues to take the medication as directed    Patient stating that she is having increasing low back pain.  She states she has had evaluations completed in the past that revealed disc degenerative disease and spinal stenosis in the lower lumbar spine.  She has not had any recent imaging.  She does state that she had injections in her back in the past, but did not provide adequate relief.  Pain is mostly lower lumbar and left-sided.  She will have occasional shooting pain down her legs especially when standing.  She is having sharp pain in the low back and will sometimes cause her back to give out.  Pain is at a constant 8/10.  Radiates throughout the lower back.    She is currently trying to lose weight, but has been difficult with her low back pain.  She currently utilizes intermittent fasting, but even with this she has been unable to lose much weight.  Weight has been an issue for her low back and overall health.  She is having to start atorvastatin due to most recent labs.  She is wondering if there is medication that she can use in order to help manage her appetite.      Medications and Allergies   Current Outpatient Medications   Medication Instructions   • ARIPiprazole (ABILIFY) 5 mg, Oral, Daily   • atorvastatin (LIPITOR) 10 mg, Oral, Daily   • Combivent Respimat  MCG/ACT inhaler INHALE 1 PUFF BY MOUTH  "FOUR TIMES DAILY AS NEEDED FOR WHEEZING   • FLUoxetine (PROZAC) 20 mg, Oral, Daily   • furosemide (LASIX) 10 mg, Oral, Daily   • QUEtiapine (SEROQUEL) 100 mg, Oral, Nightly   • Wegovy 1 mg, Subcutaneous, Weekly     No Known Allergies   I have reviewed the above medications and allergies     Objective:      Vitals Vitals:    03/09/23 1307   BP: 114/76   Pulse: 92   Resp: 16   Temp: 96.4 °F (35.8 °C)   SpO2: 96%   Weight: 94.3 kg (208 lb)   Height: 165.1 cm (65\")     Body mass index is 34.61 kg/m².    Physical Exam  Vitals reviewed.   Constitutional:       General: She is not in acute distress.     Appearance: She is not ill-appearing.      Comments: Patient unable to stay seated due to low back pain   Pulmonary:      Effort: Pulmonary effort is normal.   Musculoskeletal:      Comments: Chronic Tart changes in the low back with bogginess bilaterally, but more pain and tenderness located on the lower right than on the left.  Lumbar spine appears rotated to the left and side bent right and sacrum is rotated to the right and side bent right.   Psychiatric:         Mood and Affect: Mood normal.         Behavior: Behavior normal.         Thought Content: Thought content normal.         Judgment: Judgment normal.            Assessment/Plan      Issues Addressed/ Plan  1. Hypercholesterolemia  - Patient previous labs are elevated, and she is now requiring treatment with statin therapy.  Going to start 10 mg of atorvastatin.  Going to repeat labs in a month to see if there is been improvement.  - atorvastatin (LIPITOR) 10 MG tablet; Take 1 tablet by mouth Daily.  Dispense: 90 tablet; Refill: 1    2. Class 2 obesity due to excess calories without serious comorbidity with body mass index (BMI) of 35.0 to 35.9 in adult  - With patient weight and low back issues it would be extremely beneficial for patient to lose about 20 to 30 pounds.  She has been trying to use intermittent fasting now for the last year and has been able to " lose only minor amounts of weight.  She also watches what she eats.  She is unable to exercise due to her low back pain, and is hopeful that if she loses some weight that this would improve her overall pain level.  - She is needing something to help with appetite suppression in order to minimize portions during her intermittent fasting.  - Semaglutide-Weight Management (Wegovy) 1 MG/0.5ML solution auto-injector; Inject 1 mg under the skin into the appropriate area as directed 1 (One) Time Per Week.  Dispense: 2 mL; Refill: 0    3. DDD (degenerative disc disease), lumbar  4. Spinal stenosis of lumbar region with neurogenic claudication  -No recent spine specific imaging available, but she does have a abdominal CT scan that was done about 6 months ago.  There appears to be disc base narrowing that is severe throughout the lower lumbar spine.  Particularly at L5-S1.  There also signs of osteophyte formation.  Based on patient presentation, she has to bend forward in order to feel pain relief, and this is very indicative of disc degenerative disease and spinal stenosis.  Going to suggest that she try to do physical therapy in order to help with low back strengthening, flexibility, and trying to increase her range of motion.  Our goal would be that she would be able to walk at least 30 minutes so that she may be able to exercise to help with weight loss.  - Ambulatory Referral to Physical Therapy Evaluate and treat     There are no Patient Instructions on file for this visit.      Follow up  recommended Return in about 1 month (around 4/9/2023) for Labs, HLD.   - Dragon voice recognition software was utilized to complete this chart.  Every reasonable attempt was made to edit and correct the text, however some incorrect words may remain.   Andrae Shore, DO

## 2023-03-20 NOTE — TELEPHONE ENCOUNTER
Pt requesting refill.  Theres a flag stating it was dc'd by you on Jan 9th. Should pt no longer be on this medication?

## 2023-03-21 RX ORDER — QUETIAPINE FUMARATE 50 MG/1
50 TABLET, FILM COATED ORAL NIGHTLY
Qty: 30 TABLET | Refills: 3 | Status: SHIPPED | OUTPATIENT
Start: 2023-03-21

## 2023-03-22 ENCOUNTER — HOSPITAL ENCOUNTER (OUTPATIENT)
Dept: PHYSICAL THERAPY | Facility: HOSPITAL | Age: 53
Setting detail: THERAPIES SERIES
Discharge: HOME | End: 2023-03-22
Payer: MEDICARE

## 2023-03-22 DIAGNOSIS — M48.062 SPINAL STENOSIS OF LUMBAR REGION WITH NEUROGENIC CLAUDICATION: Primary | ICD-10-CM

## 2023-03-22 DIAGNOSIS — M51.36 DDD (DEGENERATIVE DISC DISEASE), LUMBAR: ICD-10-CM

## 2023-03-22 PROCEDURE — 97161 PT EVAL LOW COMPLEX 20 MIN: CPT

## 2023-03-22 NOTE — THERAPY EVALUATION
Outpatient Physical Therapy Ortho Initial Evaluation  CHARY Arthur     Patient Name: Emily Garcia  : 1970  MRN: [id number]  Today's Date: 3/22/2023      Visit Date: 2023    Patient Active Problem List   Diagnosis   • Abnormal TSH   • Anxiety   • COPD (chronic obstructive pulmonary disease) (HCC)   • Depression   • OCD (obsessive compulsive disorder)   • PTSD (post-traumatic stress disorder)   • Class 2 obesity due to excess calories without serious comorbidity with body mass index (BMI) of 35.0 to 35.9 in adult   • Hypercholesterolemia   • DDD (degenerative disc disease), lumbar   • Spinal stenosis of lumbar region with neurogenic claudication        Past Medical History:   Diagnosis Date   • Abnormal TSH 2022   • Anxiety    • COPD (chronic obstructive pulmonary disease) (HCC)    • Depression    • Low back pain     DDD   • OCD (obsessive compulsive disorder)    • PTSD (post-traumatic stress disorder)    • Substance abuse (HCC)     Alcohol        Past Surgical History:   Procedure Laterality Date   • CHOLECYSTECTOMY     • COLONOSCOPY  2018    benign polyps   • HYSTERECTOMY      partial   • TUBAL ABDOMINAL LIGATION         Visit Dx:     ICD-10-CM ICD-9-CM   1. Spinal stenosis of lumbar region with neurogenic claudication  M48.062 724.03   2. DDD (degenerative disc disease), lumbar  M51.36 722.52          Patient History     Row Name 23 1000             History    Chief Complaint Difficulty with daily activities;Joint stiffness;Muscle tenderness;Muscle weakness;Pain;Numbness;Tinglings  -AS      Type of Pain Back pain;Lower Extremity / Leg  -AS      Brief Description of Current Complaint Emily Garcia presents to outpatient PT with reports of chronic low back pain. Patient states she has had evaluations completed in the past that revealed disc degenerative disease and spinal stenosis in the lower lumbar spine. She has not had any recent imaging. She does state that she had injections in her  back in the past, but did not provide adequate relief. Pain is mostly lower lumbar and left-sided. She will have occasional shooting pain down her legs especially when standing. She is having sharp pain in the low back and will sometimes cause her back to give out. Pain is at a constant 8/10. Radiates throughout the lower back.  -AS      Previous treatment for THIS PROBLEM Medication;Chiropractor  -AS      Patient/Caregiver Goals Relieve pain  -AS      Patient's Rating of General Health Fair  -AS      Occupation/sports/leisure activities Disabled  -AS      Patient seeing anyone else for problem(s)? Dr. Shore  -AS      How has patient tried to help current problem? rest, OTC medication  -AS      What clinical tests have you had for this problem? X-ray  -AS      Results of Clinical Tests DDD  -AS         Pain     Pain Location Back  -AS      Pain at Present 3  -AS      Pain at Best 1  -AS      Pain at Worst 8  -AS      Pain Frequency Constant/continuous  -AS      Pain Description Aching;Numbness;Tingling  -AS      What Performance Factors Make the Current Problem(s) WORSE? Activity  -AS      What Performance Factors Make the Current Problem(s) BETTER? Rest  -AS         Daily Activities    Primary Language English  -AS      Are you able to read Yes  -AS      Are you able to write Yes  -AS      How does patient learn best? Listening;Reading;Demonstration  -AS      Teaching needs identified Home Exercise Program;Management of Condition  -AS      Patient is concerned about/has problems with Difficulty with self care (i.e. bathing, dressing, toileting:;Flexibility;Performing home management (household chores, shopping, care of dependents);Performing job responsibilities/community activities (work, school,;Performing sports, recreation, and play activities;Walking  -AS      Does patient have problems with the following? Depression;Anxiety;Panic Attack  -AS      Barriers to learning None  -AS      Pt Participated in POC  and Goals Yes  -AS         Safety    Are you being hurt, hit, or frightened by anyone at home or in your life? No  -AS      Are you being neglected by a caregiver No  -AS            User Key  (r) = Recorded By, (t) = Taken By, (c) = Cosigned By    Initials Name Provider Type    AS Lamont Bhtat, PT Physical Therapist                 PT Ortho     Row Name 03/22/23 1000       Precautions and Contraindications    Precautions/Limitations no known precautions/limitations  -AS       Subjective Pain    Able to rate subjective pain? yes  -AS    Pre-Treatment Pain Level 3  -AS    Post-Treatment Pain Level 3  -AS       Posture/Observations    Posture- WNL Posture is WNL  -AS       Lumbar/SI Special Tests    Standing Flexion Test (SI Dysfunction) Bilateral:;Negative  -AS    Stork Test (SI Dysfunction) Bilateral:;Negative  -AS    SLR (Neural Tension) Bilateral:;Positive  -AS       Head/Neck/Trunk    Trunk Extension AROM 25% limited  -AS    Trunk Flexion AROM 25% limited  -AS    Trunk Lt Lateral Flexion AROM WFL  -AS    Trunk Rt Lateral Flexion AROM WFL  -AS    Trunk Lt Rotation AROM WFL  -AS    Trunk Rt Rotation AROM WFL  -AS       MMT Neck/Trunk    Trunk Flexion MMT, Gross Movement (3+/5) fair plus  -AS    Trunk Extension MMT, Gross Movement (3+/5) fair plus  -AS       MMT Right Lower Ext    Rt Hip Flexion MMT, Gross Movement (4-/5) good minus  -AS    Rt Hip Extension MMT, Gross Movement (4-/5) good minus  -AS    Rt Hip ABduction MMT, Gross Movement (4-/5) good minus  -AS       MMT Left Lower Ext    Lt Hip Flexion MMT, Gross Movement (4-/5) good minus  -AS    Lt Hip Extension MMT, Gross Movement (4-/5) good minus  -AS    Lt Hip ABduction MMT, Gross Movement (4-/5) good minus  -AS       Sensation    Sensation WNL? WNL  -AS    Light Touch No apparent deficits  -AS       Lower Extremity Flexibility    Hamstrings Bilateral:;Moderately limited  -AS    Hip External Rotators Bilateral:;Moderately limited  -AS        Gait/Stairs (Locomotion)    Comment, (Gait/Stairs) Patient ambulates without the use of an AD at this time. She ambulates with a normal heel to toe gait pattern and speed.  -AS          User Key  (r) = Recorded By, (t) = Taken By, (c) = Cosigned By    Initials Name Provider Type    AS Lamont Bhatt, PT Physical Therapist                            Therapy Education  Given: HEP, Symptoms/condition management, Pain management  Program: New  How Provided: Verbal, Demonstration, Written  Provided to: Patient  Level of Understanding: Teach back education performed, Verbalized, Demonstrated      PT OP Goals     Row Name 03/22/23 1000          PT Short Term Goals    STG Date to Achieve 04/05/23  -AS     STG 1 Patient to demonstrate compliance with her initial HEP for flexibility, ROM and strengthening.  -AS     STG 2 Patient to report improved radicular symptoms into bilateral LE by 25-50%.  -AS     STG 3 Patient to report low back and bilateral LE pain on VAS of 4-5/10 at worst with activity.  -AS     STG 4 Patient to demonstrate improved trunk and bilateral LE strength to 4+/5 in all planes.  -AS        Long Term Goals    LTG Date to Achieve 04/19/23  -AS     LTG 1 Patient to demonstrate compliance with her advanced HEP for flexibility, ROM and strengthening.  -AS     LTG 2 Patient to report improved radicular symptoms into bilateral LE by 50-75%.  -AS     LTG 3 Patient to report low back and bilateral LE pain on VAS of 1-2/10 at worst with activity.  -AS     LTG 4 Patient to demonstrate improved trunk and bilateral LE strength to 5/5 in all planes.  -AS     LTG 5 Patient to demonstrate trunk ROM to WFL in all planes.  -AS     LTG 6 Patient to report improved function on Back Index by 10-15 points.  -AS        Time Calculation    PT Goal Re-Cert Due Date 04/19/23  -AS           User Key  (r) = Recorded By, (t) = Taken By, (c) = Cosigned By    Initials Name Provider Type    AS Lamont Bhatt, PT Physical  Therapist                 PT Assessment/Plan     Row Name 03/22/23 1000          PT Assessment    Functional Limitations Limitation in home management;Limitations in community activities;Performance in leisure activities;Performance in sport activities;Performance in work activities  -AS     Impairments Impaired flexibility;Muscle strength;Pain;Range of motion  -AS     Assessment Comments Patient presents to outpatient PT with reports of chronic low back pain and bilateral LE pain. Patient has limited trunk ROM, limited trunk and bilateral hip strength, and has increased symptoms with activity. Patient has limited function at this time secondary to the above.  -AS     Please refer to paper survey for additional self-reported information Yes  -AS     Rehab Potential Good  -AS     Patient/caregiver participated in establishment of treatment plan and goals Yes  -AS     Patient would benefit from skilled therapy intervention Yes  -AS        PT Plan    PT Frequency 1x/week;2x/week  -AS     Predicted Duration of Therapy Intervention (PT) 4 weeks  -AS     Planned CPT's? PT RE-EVAL: 56653;PT THER PROC EA 15 MIN: 71551;PT THER ACT EA 15 MIN: 60859;PT MANUAL THERAPY EA 15 MIN: 45102;PT NEUROMUSC RE-EDUCATION EA 15 MIN: 47174  -AS           User Key  (r) = Recorded By, (t) = Taken By, (c) = Cosigned By    Initials Name Provider Type    AS Lamont Bhatt, PT Physical Therapist                 Modalities     Row Name 03/22/23 1000             Traction 17354    Traction Type Lumbar  -AS      PT Traction Rx Minutes 15  -AS      Duration Intermittent  -AS      Position Supine  -AS      Weight 50  25  -AS      Hold 60  -AS      Relax 10  -AS            User Key  (r) = Recorded By, (t) = Taken By, (c) = Cosigned By    Initials Name Provider Type    AS Lamont Bhatt, PT Physical Therapist               OP Exercises     Row Name 03/22/23 1000             Subjective Pain    Able to rate subjective pain? yes  -AS       Pre-Treatment Pain Level 3  -AS      Post-Treatment Pain Level 3  -AS         Exercise 1    Exercise Name 1 Charles. HS Stretch  -AS      Reps 1 10  -AS      Time 1 10 sec hold each  -AS         Exercise 2    Exercise Name 2 Charles. Piriformis Stretch  -AS      Reps 2 10  -AS      Time 2 10 sec hold each  -AS         Exercise 3    Exercise Name 3 Lumbar Rotations  -AS      Reps 3 25 each side  -AS         Exercise 4    Exercise Name 4 Supine Clams  -AS      Reps 4 25  -AS      Time 4 Black  -AS         Exercise 5    Exercise Name 5 Bridge vs Band  -AS      Reps 5 25  -AS      Time 5 Black  -AS         Exercise 6    Exercise Name 6 Hip ADD vs Ball  -AS         Exercise 7    Exercise Name 7 PPT  -AS         Exercise 8    Exercise Name 8 Seated Hip IR/ER vs Band  -AS            User Key  (r) = Recorded By, (t) = Taken By, (c) = Cosigned By    Initials Name Provider Type    AS Lamont Bhatt, PT Physical Therapist                              Outcome Measure Options: Other Outcome Measure  Other Outcome Measure Tool Used  Other Outcome Measure Tool Comments: Back Index - 36      Time Calculation:     Start Time: 1000  Stop Time: 1102  Time Calculation (min): 62 min  Untimed Charges  PT Traction Rx Minutes: 15  Total Minutes  Untimed Charges Total Minutes: 15   Total Minutes: 15     Therapy Charges for Today     Code Description Service Date Service Provider Modifiers Qty    29804055727 HC PT EVAL LOW COMPLEXITY 4 3/22/2023 Lamont Bhatt, PT GP 1          PT G-Codes  Outcome Measure Options: Other Outcome Measure         Lamont Bhatt, PT  3/22/2023

## 2023-03-27 ENCOUNTER — HOSPITAL ENCOUNTER (OUTPATIENT)
Dept: PHYSICAL THERAPY | Facility: HOSPITAL | Age: 53
Setting detail: THERAPIES SERIES
Discharge: HOME | End: 2023-03-27
Payer: MEDICARE

## 2023-03-27 DIAGNOSIS — M51.36 DDD (DEGENERATIVE DISC DISEASE), LUMBAR: ICD-10-CM

## 2023-03-27 DIAGNOSIS — M48.062 SPINAL STENOSIS OF LUMBAR REGION WITH NEUROGENIC CLAUDICATION: Primary | ICD-10-CM

## 2023-03-27 PROCEDURE — 97012 MECHANICAL TRACTION THERAPY: CPT

## 2023-03-27 NOTE — THERAPY TREATMENT NOTE
Outpatient Physical Therapy Ortho Treatment Note  CHARY Arthur     Patient Name: Emily Garcia  : 1970  MRN: [id number]  Today's Date: 3/27/2023      Visit Date: 2023    Visit Dx:    ICD-10-CM ICD-9-CM   1. Spinal stenosis of lumbar region with neurogenic claudication  M48.062 724.03   2. DDD (degenerative disc disease), lumbar  M51.36 722.52       Patient Active Problem List   Diagnosis   • Abnormal TSH   • Anxiety   • COPD (chronic obstructive pulmonary disease) (HCC)   • Depression   • OCD (obsessive compulsive disorder)   • PTSD (post-traumatic stress disorder)   • Class 2 obesity due to excess calories without serious comorbidity with body mass index (BMI) of 35.0 to 35.9 in adult   • Hypercholesterolemia   • DDD (degenerative disc disease), lumbar   • Spinal stenosis of lumbar region with neurogenic claudication        Past Medical History:   Diagnosis Date   • Abnormal TSH 2022   • Anxiety    • COPD (chronic obstructive pulmonary disease) (HCC)    • Depression    • Low back pain     DDD   • OCD (obsessive compulsive disorder)    • PTSD (post-traumatic stress disorder)    • Substance abuse ([facility])     Alcohol        Past Surgical History:   Procedure Laterality Date   • CHOLECYSTECTOMY     • COLONOSCOPY      benign polyps   • HYSTERECTOMY      partial   • TUBAL ABDOMINAL LIGATION                          PT Assessment/Plan     Row Name 23 1000          PT Assessment    Assessment Comments Lumbar traction continued to taday and she tolerated this well.  -AS        PT Plan    PT Plan Comments Continue with current treatment plan.  -AS           User Key  (r) = Recorded By, (t) = Taken By, (c) = Cosigned By    Initials Name Provider Type    AS Lamont Bhatt, PT Physical Therapist                 Modalities     Row Name 23 1000             Traction 38478    Traction Type Lumbar  -AS      PT Traction Rx Minutes 20  -AS      Duration Intermittent  -AS      Position Supine   -AS      Weight 60  25  -AS      Hold 60  -AS      Relax 10  -AS         Functional Testing    Outcome Measure Options Other Outcome Measure  -AS            User Key  (r) = Recorded By, (t) = Taken By, (c) = Cosigned By    Initials Name Provider Type    AS Lamont Bhatt, PT Physical Therapist               OP Exercises     Row Name 03/27/23 1000             Subjective Comments    Subjective Comments Patient states she is doing well. She states she feels the exercises and the lumbar traction have helped. She states she is bein g compliant with her HEP.  -AS         Exercise 1    Exercise Name 1 Charles. HS Stretch  -AS      Reps 1 10  -AS      Time 1 10 sec hold each  -AS         Exercise 2    Exercise Name 2 Charles. Piriformis Stretch  -AS      Reps 2 10  -AS      Time 2 10 sec hold each  -AS         Exercise 3    Exercise Name 3 Lumbar Rotations  -AS      Reps 3 25 each side  -AS         Exercise 4    Exercise Name 4 Supine Clams  -AS      Reps 4 25  -AS      Time 4 Black  -AS         Exercise 5    Exercise Name 5 Bridge vs Band  -AS      Reps 5 25  -AS      Time 5 Black  -AS         Exercise 6    Exercise Name 6 Hip ADD vs Ball  -AS         Exercise 7    Exercise Name 7 PPT  -AS         Exercise 8    Exercise Name 8 Seated Hip IR/ER vs Band  -AS            User Key  (r) = Recorded By, (t) = Taken By, (c) = Cosigned By    Initials Name Provider Type    AS Lamont Bhatt, PT Physical Therapist                                      Outcome Measure Options: Other Outcome Measure         Time Calculation:   Start Time: 1002  Stop Time: 1040  Time Calculation (min): 38 min  Untimed Charges  PT Traction Rx Minutes: 20  Total Minutes  Untimed Charges Total Minutes: 20   Total Minutes: 20  Therapy Charges for Today     Code Description Service Date Service Provider Modifiers Qty    20761834024  PT TRACTION LUMBAR 3/27/2023 Lamont Bhatt, PT GP 1          PT G-Codes  Outcome Measure Options: Other Outcome  Measure         Lamont Bhatt, PT  3/27/2023

## 2023-04-06 ENCOUNTER — OFFICE VISIT (OUTPATIENT)
Dept: FAMILY MEDICINE CLINIC | Facility: CLINIC | Age: 53
End: 2023-04-06
Payer: MEDICARE

## 2023-04-06 VITALS
OXYGEN SATURATION: 98 % | BODY MASS INDEX: 35.32 KG/M2 | DIASTOLIC BLOOD PRESSURE: 60 MMHG | TEMPERATURE: 97.5 F | WEIGHT: 212 LBS | RESPIRATION RATE: 18 BRPM | HEIGHT: 65 IN | SYSTOLIC BLOOD PRESSURE: 102 MMHG | HEART RATE: 86 BPM

## 2023-04-06 DIAGNOSIS — E78.00 HYPERCHOLESTEROLEMIA: Primary | ICD-10-CM

## 2023-04-06 DIAGNOSIS — E03.8 SUBCLINICAL HYPOTHYROIDISM: ICD-10-CM

## 2023-04-06 DIAGNOSIS — E66.09 CLASS 2 OBESITY DUE TO EXCESS CALORIES WITHOUT SERIOUS COMORBIDITY WITH BODY MASS INDEX (BMI) OF 35.0 TO 35.9 IN ADULT: ICD-10-CM

## 2023-04-06 NOTE — PROGRESS NOTES
"    Andrae Shore DO  [facility]  [address]  [place] [id number] [phone]    Subjective      Name Emily Garcia MRN [id number]    1970 AGE/SEX 52 y.o. / female      Chief Complaint Chief Complaint   Patient presents with   • Hypercholesterolemia     Follow up visit          Visit History for  2023    History of Present Illness  Emily Garcia is a 52 y.o. female who presented today for Hypercholesterolemia (Follow up visit )  Doing well with PT.  She has seen benefit from the stretching and exercises.      No issues with her psych meds at this time.  Feels that they are working well and has no complaints.      No other questions or concerns      Medications and Allergies   Current Outpatient Medications   Medication Instructions   • ARIPiprazole (ABILIFY) 5 mg, Oral, Daily   • atorvastatin (LIPITOR) 10 mg, Oral, Daily   • Combivent Respimat  MCG/ACT inhaler INHALE 1 PUFF BY MOUTH FOUR TIMES DAILY AS NEEDED FOR WHEEZING   • FLUoxetine (PROZAC) 20 mg, Oral, Daily   • QUEtiapine (SEROQUEL) 50 mg, Oral, Nightly     No Known Allergies   I have reviewed the above medications and allergies     Objective:      Vitals Vitals:    23 1259   BP: 102/60   BP Location: Left arm   Patient Position: Sitting   Cuff Size: Large Adult   Pulse: 86   Resp: 18   Temp: 97.5 °F (36.4 °C)   TempSrc: Infrared   SpO2: 98%   Weight: 96.2 kg (212 lb)   Height: 165.1 cm (65\")     Body mass index is 35.28 kg/m².    Physical Exam  Vitals reviewed.   Constitutional:       General: She is not in acute distress.     Appearance: She is not ill-appearing.   Cardiovascular:      Rate and Rhythm: Normal rate and regular rhythm.   Pulmonary:      Effort: Pulmonary effort is normal.      Breath sounds: Normal breath sounds.   Neurological:      Mental Status: She is alert.   Psychiatric:         Mood and Affect: Mood normal.         Behavior: Behavior normal.         Thought " Content: Thought content normal.         Judgment: Judgment normal.            Assessment/Plan      Issues Addressed/ Plan  1. Hypercholesterolemia  - Currently taking atorvastatin 10 mg as directed.  No unwanted side effects at this time.  Needing labs completed today.  - Lipid Panel  - Comprehensive metabolic panel    2. Class 2 obesity due to excess calories without serious comorbidity with body mass index (BMI) of 35.0 to 35.9 in adult  - Continuing to work on diet and exercise.  - Lipid Panel  - Comprehensive metabolic panel    3. Subclinical hypothyroidism  - She has had times where her TSH is decreased and has also had an episode where her free T4 was low. Subclinical at this time, but may need to treat in the future.  Need to continue to check labs most likely every 6 months.  - TSH  - T4, free     There are no Patient Instructions on file for this visit.      Follow up  recommended Return in about 3 months (around 7/6/2023) for Medicare Wellness.   - Dragon voice recognition software was utilized to complete this chart.  Every reasonable attempt was made to edit and correct the text, however some incorrect words may remain.   Andrae Shore, DO

## 2023-04-07 LAB
ALBUMIN SERPL-MCNC: 4.6 G/DL (ref 3.5–5.2)
ALBUMIN/GLOB SERPL: 2 G/DL
ALP SERPL-CCNC: 119 U/L (ref 39–117)
ALT SERPL-CCNC: 22 U/L (ref 1–33)
AST SERPL-CCNC: 15 U/L (ref 1–32)
BILIRUB SERPL-MCNC: 0.2 MG/DL (ref 0–1.2)
BUN SERPL-MCNC: 11 MG/DL (ref 6–20)
BUN/CREAT SERPL: 10.7 (ref 7–25)
CALCIUM SERPL-MCNC: 10.1 MG/DL (ref 8.6–10.5)
CHLORIDE SERPL-SCNC: 105 MMOL/L (ref 98–107)
CHOLEST SERPL-MCNC: 212 MG/DL (ref 0–200)
CO2 SERPL-SCNC: 27.6 MMOL/L (ref 22–29)
CREAT SERPL-MCNC: 1.03 MG/DL (ref 0.57–1)
EGFRCR SERPLBLD CKD-EPI 2021: 65.2 ML/MIN/1.73
GLOBULIN SER CALC-MCNC: 2.3 GM/DL
GLUCOSE SERPL-MCNC: 85 MG/DL (ref 65–99)
HDLC SERPL-MCNC: 61 MG/DL (ref 40–60)
LDLC SERPL CALC-MCNC: 126 MG/DL (ref 0–100)
Lab: NORMAL
POTASSIUM SERPL-SCNC: 4.9 MMOL/L (ref 3.5–5.2)
PROT SERPL-MCNC: 6.9 G/DL (ref 6–8.5)
SODIUM SERPL-SCNC: 142 MMOL/L (ref 136–145)
T4 FREE SERPL-MCNC: 0.95 NG/DL (ref 0.93–1.7)
TRIGL SERPL-MCNC: 142 MG/DL (ref 0–150)
TSH SERPL DL<=0.005 MIU/L-ACNC: 1.1 UIU/ML (ref 0.27–4.2)
VLDLC SERPL CALC-MCNC: 25 MG/DL (ref 5–40)

## 2023-04-07 NOTE — PROGRESS NOTES
Venipuncture Blood Specimen Collection  Venipuncture performed by Ranjan Rizvi MA with good hemostasis. Patient tolerated the procedure well without complications.   04/07/23   Maureen Merritt MA

## 2023-04-26 DIAGNOSIS — F43.10 PTSD (POST-TRAUMATIC STRESS DISORDER): ICD-10-CM

## 2023-04-26 RX ORDER — QUETIAPINE FUMARATE 100 MG/1
100 TABLET, FILM COATED ORAL NIGHTLY
Qty: 30 TABLET | Refills: 1 | Status: SHIPPED | OUTPATIENT
Start: 2023-04-26

## 2023-04-27 DIAGNOSIS — F33.1 MODERATE RECURRENT MAJOR DEPRESSION: ICD-10-CM

## 2023-04-27 RX ORDER — ARIPIPRAZOLE 5 MG/1
5 TABLET ORAL DAILY
Qty: 30 TABLET | Refills: 1 | Status: SHIPPED | OUTPATIENT
Start: 2023-04-27

## 2023-05-15 ENCOUNTER — TRANSCRIBE ORDERS (OUTPATIENT)
Dept: ADMINISTRATIVE | Facility: HOSPITAL | Age: 53
End: 2023-05-15
Payer: MEDICARE

## 2023-05-15 DIAGNOSIS — R31.1 BENIGN ESSENTIAL MICROSCOPIC HEMATURIA: Primary | ICD-10-CM

## 2023-05-18 ENCOUNTER — HOSPITAL ENCOUNTER (OUTPATIENT)
Dept: CT IMAGING | Facility: HOSPITAL | Age: 53
Discharge: HOME | End: 2023-05-18
Payer: MEDICARE

## 2023-05-18 DIAGNOSIS — R31.1 BENIGN ESSENTIAL MICROSCOPIC HEMATURIA: ICD-10-CM

## 2023-05-18 PROCEDURE — 25510000001 IOPAMIDOL PER 1 ML: Performed by: UROLOGY

## 2023-05-18 PROCEDURE — 74178 CT ABD&PLV WO CNTR FLWD CNTR: CPT

## 2023-05-18 RX ADMIN — IOPAMIDOL 100 ML: 755 INJECTION, SOLUTION INTRAVENOUS at 15:23

## 2023-06-14 DIAGNOSIS — F43.10 PTSD (POST-TRAUMATIC STRESS DISORDER): ICD-10-CM

## 2023-06-15 DIAGNOSIS — F33.1 MODERATE RECURRENT MAJOR DEPRESSION: ICD-10-CM

## 2023-06-15 RX ORDER — QUETIAPINE FUMARATE 100 MG/1
100 TABLET, FILM COATED ORAL NIGHTLY
Qty: 30 TABLET | Refills: 1 | Status: SHIPPED | OUTPATIENT
Start: 2023-06-15

## 2023-06-15 RX ORDER — ARIPIPRAZOLE 5 MG/1
5 TABLET ORAL DAILY
Qty: 30 TABLET | Refills: 1 | OUTPATIENT
Start: 2023-06-15

## 2023-06-19 ENCOUNTER — TELEPHONE (OUTPATIENT)
Dept: FAMILY MEDICINE CLINIC | Facility: CLINIC | Age: 53
End: 2023-06-19
Payer: MEDICARE

## 2023-06-19 NOTE — TELEPHONE ENCOUNTER
[facility] in [place] is correct it is a mailorder and yes she is taking 100 daily on seroquel she called today. Informed I sent in medicine didn't want her out.

## 2023-07-06 ENCOUNTER — OFFICE VISIT (OUTPATIENT)
Dept: FAMILY MEDICINE CLINIC | Facility: CLINIC | Age: 53
End: 2023-07-06
Payer: MEDICARE

## 2023-07-06 VITALS
BODY MASS INDEX: 35.35 KG/M2 | OXYGEN SATURATION: 97 % | HEART RATE: 91 BPM | SYSTOLIC BLOOD PRESSURE: 134 MMHG | TEMPERATURE: 98.6 F | DIASTOLIC BLOOD PRESSURE: 76 MMHG | HEIGHT: 65 IN | WEIGHT: 212.2 LBS

## 2023-07-06 DIAGNOSIS — E66.09 CLASS 2 OBESITY DUE TO EXCESS CALORIES WITHOUT SERIOUS COMORBIDITY WITH BODY MASS INDEX (BMI) OF 35.0 TO 35.9 IN ADULT: Primary | ICD-10-CM

## 2023-07-06 RX ORDER — PHENTERMINE HYDROCHLORIDE 37.5 MG/1
37.5 CAPSULE ORAL EVERY MORNING
Qty: 30 CAPSULE | Refills: 0 | Status: SHIPPED | OUTPATIENT
Start: 2023-07-06 | End: 2023-08-03 | Stop reason: SDUPTHER

## 2023-07-06 RX ORDER — ARIPIPRAZOLE 5 MG/1
5 TABLET ORAL DAILY
COMMUNITY

## 2023-08-03 ENCOUNTER — OFFICE VISIT (OUTPATIENT)
Dept: FAMILY MEDICINE CLINIC | Facility: CLINIC | Age: 53
End: 2023-08-03
Payer: MEDICARE

## 2023-08-03 VITALS
HEART RATE: 88 BPM | WEIGHT: 212.6 LBS | HEIGHT: 65 IN | RESPIRATION RATE: 20 BRPM | OXYGEN SATURATION: 97 % | DIASTOLIC BLOOD PRESSURE: 88 MMHG | BODY MASS INDEX: 35.42 KG/M2 | SYSTOLIC BLOOD PRESSURE: 124 MMHG

## 2023-08-03 DIAGNOSIS — M48.062 SPINAL STENOSIS OF LUMBAR REGION WITH NEUROGENIC CLAUDICATION: ICD-10-CM

## 2023-08-03 DIAGNOSIS — E66.09 CLASS 2 OBESITY DUE TO EXCESS CALORIES WITHOUT SERIOUS COMORBIDITY WITH BODY MASS INDEX (BMI) OF 35.0 TO 35.9 IN ADULT: ICD-10-CM

## 2023-08-03 DIAGNOSIS — K21.9 GASTROESOPHAGEAL REFLUX DISEASE WITHOUT ESOPHAGITIS: Primary | ICD-10-CM

## 2023-08-03 RX ORDER — IBUPROFEN 800 MG/1
800 TABLET ORAL EVERY 6 HOURS PRN
Qty: 120 TABLET | Refills: 0 | Status: SHIPPED | OUTPATIENT
Start: 2023-08-03 | End: 2023-09-02

## 2023-08-03 RX ORDER — OMEPRAZOLE 20 MG/1
20 CAPSULE, DELAYED RELEASE ORAL DAILY
Qty: 30 CAPSULE | Refills: 2 | Status: SHIPPED | OUTPATIENT
Start: 2023-08-03

## 2023-08-03 RX ORDER — PHENTERMINE HYDROCHLORIDE 37.5 MG/1
37.5 CAPSULE ORAL EVERY MORNING
Qty: 30 CAPSULE | Refills: 0 | Status: SHIPPED | OUTPATIENT
Start: 2023-08-03

## 2023-08-05 DIAGNOSIS — F43.10 PTSD (POST-TRAUMATIC STRESS DISORDER): ICD-10-CM

## 2023-08-07 DIAGNOSIS — F43.10 PTSD (POST-TRAUMATIC STRESS DISORDER): ICD-10-CM

## 2023-08-07 RX ORDER — QUETIAPINE FUMARATE 100 MG/1
100 TABLET, FILM COATED ORAL NIGHTLY
Qty: 30 TABLET | Refills: 1 | Status: SHIPPED | OUTPATIENT
Start: 2023-08-07

## 2023-08-07 RX ORDER — QUETIAPINE FUMARATE 100 MG/1
100 TABLET, FILM COATED ORAL NIGHTLY
Qty: 30 TABLET | Refills: 1 | Status: SHIPPED | OUTPATIENT
Start: 2023-08-07 | End: 2023-08-07 | Stop reason: SDUPTHER

## 2023-09-06 DIAGNOSIS — E78.00 HYPERCHOLESTEROLEMIA: ICD-10-CM

## 2023-09-06 RX ORDER — ATORVASTATIN CALCIUM 10 MG/1
10 TABLET, FILM COATED ORAL DAILY
Qty: 90 TABLET | Refills: 1 | Status: SHIPPED | OUTPATIENT
Start: 2023-09-06

## 2023-09-06 NOTE — TELEPHONE ENCOUNTER
Rx Refill Note  Requested Prescriptions     Pending Prescriptions Disp Refills    atorvastatin (LIPITOR) 10 MG tablet [Pharmacy Med Name: ATORVASTATIN 10MG TABLETS] 90 tablet 1     Sig: TAKE 1 TABLET BY MOUTH DAILY      Last office visit with prescribing clinician: 8/3/2023   Last telemedicine visit with prescribing clinician: Visit date not found   Next office visit with prescribing clinician: Visit date not found

## 2023-09-14 ENCOUNTER — OFFICE VISIT (OUTPATIENT)
Dept: FAMILY MEDICINE CLINIC | Facility: CLINIC | Age: 53
End: 2023-09-14
Payer: MEDICARE

## 2023-09-14 VITALS
OXYGEN SATURATION: 96 % | RESPIRATION RATE: 22 BRPM | HEART RATE: 100 BPM | DIASTOLIC BLOOD PRESSURE: 84 MMHG | HEIGHT: 65 IN | SYSTOLIC BLOOD PRESSURE: 138 MMHG | WEIGHT: 211.2 LBS | BODY MASS INDEX: 35.19 KG/M2

## 2023-09-14 DIAGNOSIS — J41.0 SIMPLE CHRONIC BRONCHITIS: ICD-10-CM

## 2023-09-14 DIAGNOSIS — Z12.2 SCREENING FOR LUNG CANCER: Primary | ICD-10-CM

## 2023-09-14 DIAGNOSIS — Z87.891 PERSONAL HISTORY OF NICOTINE DEPENDENCE: ICD-10-CM

## 2023-09-14 RX ORDER — VARENICLINE TARTRATE 1 MG/1
1 TABLET, FILM COATED ORAL 2 TIMES DAILY
Qty: 56 TABLET | Refills: 1 | Status: SHIPPED | OUTPATIENT
Start: 2023-10-12 | End: 2023-12-07

## 2023-09-14 RX ORDER — FLUTICASONE PROPIONATE 110 UG/1
1 AEROSOL, METERED RESPIRATORY (INHALATION)
Qty: 12 G | Refills: 3 | Status: SHIPPED | OUTPATIENT
Start: 2023-09-14 | End: 2023-09-14 | Stop reason: SDUPTHER

## 2023-09-14 RX ORDER — FLUTICASONE PROPIONATE 110 UG/1
1 AEROSOL, METERED RESPIRATORY (INHALATION)
Qty: 12 G | Refills: 3 | Status: SHIPPED | OUTPATIENT
Start: 2023-09-14

## 2023-09-14 RX ORDER — VARENICLINE TARTRATE 25 MG
0.5 KIT ORAL DAILY
Qty: 1 EACH | Refills: 0 | Status: SHIPPED | OUTPATIENT
Start: 2023-09-14

## 2023-09-14 NOTE — PROGRESS NOTES
"    Andrae Shore DO  [facility]  [address]  [place] [id number] [phone]    Subjective      Name Emily Garcia MRN [id number]    1970 AGE/SEX 53 y.o. / female      Chief Complaint Chief Complaint   Patient presents with    Obesity     Follow up and weight check.     URI     Just got over covid and is still having some breathing issues         Visit History for  2023    History of Present Illness  Emily Garcia is a 53 y.o. female who presented today for Obesity (Follow up and weight check. ) and URI (Just got over covid and is still having some breathing issues)    Still having cough and shortness of breath.      Would like to try to stop smoking.      Continuing with medication for wt loss.  No unwanted side effects at this time.        Medications and Allergies   Current Outpatient Medications   Medication Instructions    ARIPiprazole (ABILIFY) 5 mg, Oral, Daily    atorvastatin (LIPITOR) 10 mg, Oral, Daily    Combivent Respimat  MCG/ACT inhaler INHALE 1 PUFF BY MOUTH FOUR TIMES DAILY AS NEEDED FOR WHEEZING    fluticasone (Flovent HFA) 110 MCG/ACT inhaler 1 puff, Inhalation, 2 Times Daily - RT    omeprazole (PRILOSEC) 20 mg, Oral, Daily    phentermine 37.5 mg, Oral, Every Morning    QUEtiapine (SEROQUEL) 100 mg, Oral, Nightly    [START ON 10/12/2023] varenicline (CHANTIX CONTINUING MONTH ) 1 mg, Oral, 2 Times Daily    Varenicline Tartrate (Starter) 0.5 mg, Oral, Daily, Take 0.5 mg po daily x 3 days, then 0.5 mg po bid x 4 days, then 1 mg po bid     No Known Allergies   I have reviewed the above medications and allergies     Objective:      Vitals Vitals:    23 1302   BP: 138/84   BP Location: Right arm   Patient Position: Sitting   Cuff Size: Large Adult   Pulse: 100   Resp: 22   SpO2: 96%   Weight: 95.8 kg (211 lb 3.2 oz)   Height: 165.1 cm (65\")     Body mass index is 35.15 kg/m².    Physical Exam  Vitals reviewed.   Constitutional:  "      General: She is not in acute distress.     Appearance: She is not ill-appearing.   Pulmonary:      Effort: Pulmonary effort is normal.      Breath sounds: Wheezing present.   Psychiatric:         Mood and Affect: Mood normal.         Behavior: Behavior normal.         Thought Content: Thought content normal.         Judgment: Judgment normal.          Assessment/Plan      Issues Addressed/ Plan  1. Screening for lung cancer  - CT Chest Low Dose Wo; Future    2. Personal history of nicotine dependence  - Would like to start chantix.  Discussed the positives and negatives of medication.    - CT Chest Low Dose Wo; Future  - Varenicline Tartrate, Starter, 0.5 MG X 11 & 1 MG X 42 tablet therapy pack; Take 0.5 mg by mouth Daily. Take 0.5 mg po daily x 3 days, then 0.5 mg po bid x 4 days, then 1 mg po bid  Dispense: 1 each; Refill: 0  - varenicline (Chantix Continuing Month Christo) 1 MG tablet; Take 1 tablet by mouth 2 (Two) Times a Day for 56 days.  Dispense: 56 tablet; Refill: 1    3. Simple chronic bronchitis  - Needing to start medication for COPD.    - fluticasone (Flovent HFA) 110 MCG/ACT inhaler; Inhale 1 puff 2 (Two) Times a Day.  Dispense: 12 g; Refill: 3     There are no Patient Instructions on file for this visit.        Follow up  recommended Return in about 1 month (around 10/14/2023).   - Dragon voice recognition software was utilized to complete this chart.  Every reasonable attempt was made to edit and correct the text, however some incorrect words may remain.   Andrae Shore, DO

## 2023-09-28 ENCOUNTER — HOSPITAL ENCOUNTER (OUTPATIENT)
Dept: CT IMAGING | Facility: HOSPITAL | Age: 53
Discharge: HOME | End: 2023-09-28
Admitting: STUDENT IN AN ORGANIZED HEALTH CARE EDUCATION/TRAINING PROGRAM
Payer: MEDICARE

## 2023-09-28 DIAGNOSIS — Z87.891 PERSONAL HISTORY OF NICOTINE DEPENDENCE: ICD-10-CM

## 2023-09-28 DIAGNOSIS — Z12.2 SCREENING FOR LUNG CANCER: ICD-10-CM

## 2023-09-28 PROCEDURE — 71271 CT THORAX LUNG CANCER SCR C-: CPT

## 2023-09-29 DIAGNOSIS — F43.10 PTSD (POST-TRAUMATIC STRESS DISORDER): ICD-10-CM

## 2023-09-29 RX ORDER — QUETIAPINE FUMARATE 100 MG/1
100 TABLET, FILM COATED ORAL NIGHTLY
Qty: 30 TABLET | Refills: 1 | Status: SHIPPED | OUTPATIENT
Start: 2023-09-29

## 2023-11-16 DIAGNOSIS — Z87.891 PERSONAL HISTORY OF NICOTINE DEPENDENCE: ICD-10-CM

## 2023-11-16 RX ORDER — VARENICLINE TARTRATE 1 MG/1
1 TABLET, FILM COATED ORAL 2 TIMES DAILY
Qty: 56 TABLET | Refills: 1 | Status: SHIPPED | OUTPATIENT
Start: 2023-11-16

## 2023-11-17 DIAGNOSIS — Z87.891 PERSONAL HISTORY OF NICOTINE DEPENDENCE: ICD-10-CM

## 2023-11-17 RX ORDER — VARENICLINE TARTRATE 1 MG/1
1 TABLET, FILM COATED ORAL 2 TIMES DAILY
Qty: 180 TABLET | OUTPATIENT
Start: 2023-11-17

## 2023-11-18 DIAGNOSIS — F43.10 PTSD (POST-TRAUMATIC STRESS DISORDER): ICD-10-CM

## 2023-11-20 RX ORDER — QUETIAPINE FUMARATE 100 MG/1
100 TABLET, FILM COATED ORAL NIGHTLY
Qty: 30 TABLET | Refills: 1 | Status: SHIPPED | OUTPATIENT
Start: 2023-11-20

## 2023-11-20 NOTE — TELEPHONE ENCOUNTER
Rx Refill Note  Requested Prescriptions     Pending Prescriptions Disp Refills    QUEtiapine (SEROquel) 100 MG tablet [Pharmacy Med Name: QUETIAPINE 100MG TABLETS] 30 tablet 1     Sig: TAKE 1 TABLET BY MOUTH EVERY NIGHT      Last office visit with prescribing clinician: 9/14/2023   Last telemedicine visit with prescribing clinician: Visit date not found   Next office visit with prescribing clinician: 12/11/2023   {

## 2023-11-28 ENCOUNTER — TELEPHONE (OUTPATIENT)
Dept: FAMILY MEDICINE CLINIC | Facility: CLINIC | Age: 53
End: 2023-11-28
Payer: MEDICARE

## 2023-11-28 DIAGNOSIS — Z12.31 SCREENING MAMMOGRAM FOR BREAST CANCER: Primary | ICD-10-CM

## 2024-02-16 ENCOUNTER — OFFICE VISIT (OUTPATIENT)
Dept: FAMILY MEDICINE CLINIC | Facility: CLINIC | Age: 54
End: 2024-02-16
Payer: MEDICARE

## 2024-02-16 VITALS
DIASTOLIC BLOOD PRESSURE: 86 MMHG | WEIGHT: 220.5 LBS | HEIGHT: 65 IN | HEART RATE: 90 BPM | BODY MASS INDEX: 36.74 KG/M2 | RESPIRATION RATE: 22 BRPM | OXYGEN SATURATION: 97 % | SYSTOLIC BLOOD PRESSURE: 134 MMHG

## 2024-02-16 DIAGNOSIS — F33.2 SEVERE EPISODE OF RECURRENT MAJOR DEPRESSIVE DISORDER, WITHOUT PSYCHOTIC FEATURES: Primary | ICD-10-CM

## 2024-02-16 DIAGNOSIS — F43.10 PTSD (POST-TRAUMATIC STRESS DISORDER): ICD-10-CM

## 2024-02-16 DIAGNOSIS — K21.9 GASTROESOPHAGEAL REFLUX DISEASE WITHOUT ESOPHAGITIS: ICD-10-CM

## 2024-02-16 PROCEDURE — 99214 OFFICE O/P EST MOD 30 MIN: CPT | Performed by: STUDENT IN AN ORGANIZED HEALTH CARE EDUCATION/TRAINING PROGRAM

## 2024-02-16 PROCEDURE — 1160F RVW MEDS BY RX/DR IN RCRD: CPT | Performed by: STUDENT IN AN ORGANIZED HEALTH CARE EDUCATION/TRAINING PROGRAM

## 2024-02-16 PROCEDURE — 1159F MED LIST DOCD IN RCRD: CPT | Performed by: STUDENT IN AN ORGANIZED HEALTH CARE EDUCATION/TRAINING PROGRAM

## 2024-02-16 RX ORDER — QUETIAPINE FUMARATE 100 MG/1
100 TABLET, FILM COATED ORAL NIGHTLY
Qty: 30 TABLET | Refills: 1 | Status: SHIPPED | OUTPATIENT
Start: 2024-02-16

## 2024-02-16 RX ORDER — VENLAFAXINE HYDROCHLORIDE 37.5 MG/1
37.5 CAPSULE, EXTENDED RELEASE ORAL DAILY
Qty: 30 CAPSULE | Refills: 2 | Status: SHIPPED | OUTPATIENT
Start: 2024-02-16

## 2024-02-16 RX ORDER — DIAZEPAM 2 MG/1
2 TABLET ORAL 2 TIMES DAILY PRN
Qty: 10 TABLET | Refills: 0 | Status: SHIPPED | OUTPATIENT
Start: 2024-02-16

## 2024-02-16 RX ORDER — OMEPRAZOLE 20 MG/1
20 CAPSULE, DELAYED RELEASE ORAL DAILY
Qty: 30 CAPSULE | Refills: 2 | Status: SHIPPED | OUTPATIENT
Start: 2024-02-16

## 2024-02-16 NOTE — PROGRESS NOTES
"    Andrae Shore DO  [facility]  [address]  [place] [address] [phone]    Subjective      Name Emily Garcia MRN [id number]    1970 AGE/SEX 53 y.o. / female      Chief Complaint Chief Complaint   Patient presents with    Anxiety     Having issues with anxiety and sleeping     Med Refill     Follow up and medication refills          Visit History for  2024    History of Present Illness  Emily Garcia is a 53 y.o. female who presented today for Anxiety (Having issues with anxiety and sleeping ) and Med Refill (Follow up and medication refills )    Patient has been struggling with anxiety since his last visit. Has an intense fear of being alone and dying alone, which has been bothering her for months. Actually had to stay with someone for a while due to this anxiety. Discontinued Abilify because it was not helping. Asking for a refill Seroquel. Klonopin has worked well for her in the past. Never had a Lango test. Does not see anyone for mental health. Has been hospitalized in the past for anxiety, as she ran out of her medication and then his sister passed away in 2023 or 2023. Has taken Lexapro in the past, but made her feel like a \"zombie. Zoloft caused inappropriate laughing. Does not like  Prozac. He has been on Effexor but cannot remember if it worked for her. Not sleeping well and experiences severe headaches every day.    Requesting a refill on omeprazole.      Medications and Allergies   Current Outpatient Medications   Medication Instructions    atorvastatin (LIPITOR) 10 mg, Oral, Daily    Cariprazine HCl (VRAYLAR) 1.5 mg, Oral, Daily    Combivent Respimat  MCG/ACT inhaler INHALE 1 PUFF BY MOUTH FOUR TIMES DAILY AS NEEDED FOR WHEEZING    diazePAM (VALIUM) 2 mg, Oral, 2 Times Daily PRN    fluticasone (Flovent HFA) 110 MCG/ACT inhaler 1 puff, Inhalation, 2 Times Daily - RT    omeprazole (PRILOSEC) 20 mg, Oral, Daily    QUEtiapine " "(SEROQUEL) 100 mg, Oral, Nightly    varenicline (CHANTIX) 1 mg, Oral, 2 Times Daily    venlafaxine XR (EFFEXOR-XR) 37.5 mg, Oral, Daily     No Known Allergies   I have reviewed the above medications and allergies     Objective:      Vitals Vitals:    02/16/24 0843   BP: 134/86   BP Location: Right arm   Patient Position: Sitting   Cuff Size: Large Adult   Pulse: 90   Resp: 22   SpO2: 97%   Weight: 100 kg (220 lb 8 oz)   Height: 165.1 cm (65\")     Body mass index is 36.69 kg/m².    Physical Exam  Vitals reviewed.   Constitutional:       General: She is not in acute distress.     Appearance: She is not ill-appearing.   Pulmonary:      Effort: Pulmonary effort is normal.   Psychiatric:         Mood and Affect: Mood normal.         Behavior: Behavior normal.         Thought Content: Thought content normal.         Judgment: Judgment normal.            Assessment/Plan      Issues Addressed/ Plan   Diagnosis Plan   1. Severe episode of recurrent major depressive disorder, without psychotic features  Cariprazine HCl (Vraylar) 1.5 MG capsule capsule    venlafaxine XR (EFFEXOR-XR) 37.5 MG 24 hr capsule    diazePAM (Valium) 2 MG tablet      2. PTSD (post-traumatic stress disorder)  QUEtiapine (SEROquel) 100 MG tablet    Cariprazine HCl (Vraylar) 1.5 MG capsule capsule    venlafaxine XR (EFFEXOR-XR) 37.5 MG 24 hr capsule    diazePAM (Valium) 2 MG tablet      3. Gastroesophageal reflux disease without esophagitis  omeprazole (priLOSEC) 20 MG capsule         Patient Instructions   Need to look at the website [employer] Today.      1. Anxiety.  Will order a [facility] test. Gave her name of a website called [employer] Today. Provided her with samples of Vraylar. Prescribed Valium as needed 2 times a day, 0.5 tablet to start. Will restart Seroquel.    2. GERD  Refilled omeprazole and Lipitor.    Follow-up  The patient will follow up in 2 weeks.          Follow up  recommended Return in about 2 weeks (around 3/1/2024) for anxiety. "   - Dragon voice recognition software was utilized to complete this chart.  Every reasonable attempt was made to edit and correct the text, however some incorrect words may remain.   Andrae Shore DO    Transcribed from ambient dictation for Andrae Shore DO by Myriam Fuller.  02/16/24   11:37 EST    Patient or patient representative verbalized consent to the visit recording.  I have personally performed the services described in this document as transcribed by the above individual, and it is both accurate and complete.

## 2024-02-20 DIAGNOSIS — E78.00 HYPERCHOLESTEROLEMIA: ICD-10-CM

## 2024-02-20 RX ORDER — ATORVASTATIN CALCIUM 10 MG/1
10 TABLET, FILM COATED ORAL DAILY
Qty: 90 TABLET | Refills: 1 | Status: SHIPPED | OUTPATIENT
Start: 2024-02-20

## 2024-03-05 ENCOUNTER — OFFICE VISIT (OUTPATIENT)
Dept: FAMILY MEDICINE CLINIC | Facility: CLINIC | Age: 54
End: 2024-03-05
Payer: MEDICARE

## 2024-03-05 VITALS
HEIGHT: 65 IN | HEART RATE: 96 BPM | WEIGHT: 224 LBS | OXYGEN SATURATION: 96 % | SYSTOLIC BLOOD PRESSURE: 128 MMHG | DIASTOLIC BLOOD PRESSURE: 30 MMHG | RESPIRATION RATE: 20 BRPM | BODY MASS INDEX: 37.32 KG/M2

## 2024-03-05 DIAGNOSIS — F43.10 PTSD (POST-TRAUMATIC STRESS DISORDER): ICD-10-CM

## 2024-03-05 DIAGNOSIS — F51.01 PRIMARY INSOMNIA: Primary | ICD-10-CM

## 2024-03-05 DIAGNOSIS — E66.09 CLASS 2 OBESITY DUE TO EXCESS CALORIES WITHOUT SERIOUS COMORBIDITY WITH BODY MASS INDEX (BMI) OF 35.0 TO 35.9 IN ADULT: ICD-10-CM

## 2024-03-05 RX ORDER — IBUPROFEN 200 MG
200 TABLET ORAL EVERY 6 HOURS PRN
COMMUNITY

## 2024-03-05 RX ORDER — QUETIAPINE FUMARATE 150 MG/1
150 TABLET, FILM COATED ORAL NIGHTLY
Qty: 30 TABLET | Refills: 2 | Status: SHIPPED | OUTPATIENT
Start: 2024-03-05

## 2024-03-05 RX ORDER — PHENTERMINE HYDROCHLORIDE 37.5 MG/1
37.5 TABLET ORAL
Qty: 30 TABLET | Refills: 0 | Status: SHIPPED | OUTPATIENT
Start: 2024-03-05

## 2024-03-05 NOTE — PROGRESS NOTES
Andrae Shore,   [facility]  [facility]  [place] [id number] [phone]    Subjective      Name mEily Garcia MRN [id number]    1970 AGE/SEX 53 y.o. / female      Chief Complaint Chief Complaint   Patient presents with    Anxiety     2 week follow up          Visit History for  2024    History of Present Illness  Emily Garcia is a 53 y.o. female who presented today for Anxiety (2 week follow up )    Feeling much better and has no complaints today. Has gained some weight. Interested in trying over-the-counter medication to burn fat. Coverage for Wegovy, was denied by insurance. Has tried phentermine alternate day dose in the past, which worked for a while and denies any side effects.    Would like to increase the dosage of Seroquel, has tried 300mg in the past, and did not tolerate it well. Taking Vraylar, Effexor, and Seroquel at night.          Medications and Allergies   Current Outpatient Medications   Medication Instructions    atorvastatin (LIPITOR) 10 mg, Oral, Daily    Cariprazine HCl (VRAYLAR) 1.5 mg, Oral, Daily    Combivent Respimat  MCG/ACT inhaler INHALE 1 PUFF BY MOUTH FOUR TIMES DAILY AS NEEDED FOR WHEEZING    diazePAM (VALIUM) 2 mg, Oral, 2 Times Daily PRN    fluticasone (Flovent HFA) 110 MCG/ACT inhaler 1 puff, Inhalation, 2 Times Daily - RT    ibuprofen (ADVIL,MOTRIN) 200 mg, Oral, Every 6 Hours PRN    omeprazole (PRILOSEC) 20 mg, Oral, Daily    phentermine (ADIPEX-P) 37.5 mg, Oral, Every Morning Before Breakfast    QUEtiapine Fumarate 150 mg, Oral, Nightly    varenicline (CHANTIX) 1 mg, Oral, 2 Times Daily    venlafaxine XR (EFFEXOR-XR) 37.5 mg, Oral, Daily     No Known Allergies   I have reviewed the above medications and allergies     Objective:      Vitals Vitals:    24 1051   BP: (!) 128/30   BP Location: Left arm   Patient Position: Sitting   Cuff Size: Large Adult   Pulse: 96   Resp: 20   SpO2: 96%   Weight: 102  "kg (224 lb)   Height: 165.1 cm (65\")     Body mass index is 37.28 kg/m².    Physical Exam  Vitals reviewed.   Constitutional:       General: She is not in acute distress.     Appearance: She is not ill-appearing.   Pulmonary:      Effort: Pulmonary effort is normal.   Psychiatric:         Mood and Affect: Mood normal.         Behavior: Behavior normal.         Thought Content: Thought content normal.         Judgment: Judgment normal.            Assessment/Plan      Issues Addressed/ Plan   Diagnosis Plan   1. Primary insomnia  QUEtiapine 150 MG tablet      2. PTSD (post-traumatic stress disorder)  QUEtiapine 150 MG tablet      3. Class 2 obesity due to excess calories without serious comorbidity with body mass index (BMI) of 35.0 to 35.9 in adult  phentermine (ADIPEX-P) 37.5 MG tablet         There are no Patient Instructions on file for this visit.        1. Obesity.  Different weight loss medications including Ozempic and Wegovy were discussed with her. Reach out to the compounding pharmacy to inquire about different options and cost of weight loss medications. She will restart phentermine and will take it in the morning on an empty stomach.        Follow up  recommended Return in 1 month (on 4/5/2024) for medicare wellness.   - Dragon voice recognition software was utilized to complete this chart.  Every reasonable attempt was made to edit and correct the text, however some incorrect words may remain.   Andrae Shore DO      Transcribed from ambient dictation for Andrae Shore DO by Blanca Nieto.  03/05/24   14:50 EST    Patient or patient representative verbalized consent to the visit recording.  I have personally performed the services described in this document as transcribed by the above individual, and it is both accurate and complete.           "

## 2024-04-23 DIAGNOSIS — J41.0 SIMPLE CHRONIC BRONCHITIS: ICD-10-CM

## 2024-04-24 RX ORDER — FLUTICASONE PROPIONATE 110 UG/1
1 AEROSOL, METERED RESPIRATORY (INHALATION) 2 TIMES DAILY
Qty: 12 G | Refills: 3 | Status: SHIPPED | OUTPATIENT
Start: 2024-04-24

## 2024-05-01 DIAGNOSIS — F43.10 PTSD (POST-TRAUMATIC STRESS DISORDER): ICD-10-CM

## 2024-05-01 DIAGNOSIS — F33.2 SEVERE EPISODE OF RECURRENT MAJOR DEPRESSIVE DISORDER, WITHOUT PSYCHOTIC FEATURES: ICD-10-CM

## 2024-05-01 RX ORDER — CARIPRAZINE 1.5 MG/1
1.5 CAPSULE, GELATIN COATED ORAL DAILY
Qty: 30 CAPSULE | Refills: 2 | Status: SHIPPED | OUTPATIENT
Start: 2024-05-01

## 2024-05-04 DIAGNOSIS — F43.10 PTSD (POST-TRAUMATIC STRESS DISORDER): ICD-10-CM

## 2024-05-04 DIAGNOSIS — F33.2 SEVERE EPISODE OF RECURRENT MAJOR DEPRESSIVE DISORDER, WITHOUT PSYCHOTIC FEATURES: ICD-10-CM

## 2024-05-06 RX ORDER — VENLAFAXINE HYDROCHLORIDE 37.5 MG/1
37.5 CAPSULE, EXTENDED RELEASE ORAL DAILY
Qty: 30 CAPSULE | Refills: 2 | Status: SHIPPED | OUTPATIENT
Start: 2024-05-06

## 2024-06-21 DIAGNOSIS — F43.10 PTSD (POST-TRAUMATIC STRESS DISORDER): ICD-10-CM

## 2024-06-21 DIAGNOSIS — F51.01 PRIMARY INSOMNIA: ICD-10-CM

## 2024-06-21 DIAGNOSIS — K21.9 GASTROESOPHAGEAL REFLUX DISEASE WITHOUT ESOPHAGITIS: ICD-10-CM

## 2024-06-21 RX ORDER — QUETIAPINE FUMARATE 150 MG/1
150 TABLET, FILM COATED ORAL NIGHTLY
Qty: 30 TABLET | Refills: 2 | Status: SHIPPED | OUTPATIENT
Start: 2024-06-21

## 2024-06-21 RX ORDER — OMEPRAZOLE 20 MG/1
20 CAPSULE, DELAYED RELEASE ORAL DAILY
Qty: 30 CAPSULE | Refills: 2 | Status: SHIPPED | OUTPATIENT
Start: 2024-06-21

## 2024-06-21 NOTE — TELEPHONE ENCOUNTER
Caller: Emily Garcia    Relationship: Self    Best call back number:     Requested Prescriptions:   Requested Prescriptions     Pending Prescriptions Disp Refills    omeprazole (priLOSEC) 20 MG capsule 30 capsule 2     Sig: Take 1 capsule by mouth Daily.    QUEtiapine 150 MG tablet     Pharmacy where request should be sent: [facility]  [phone]     Last office visit with prescribing clinician: 3/5/2024   Last telemedicine visit with prescribing clinician: Visit date not found   Next office visit with prescribing clinician: Visit date not found     Additional details provided by patient:     Does the patient have less than a 3 day supply:  [x] Yes  [] No    Would you like a call back once the refill request has been completed: [x] Yes [] No    If the office needs to give you a call back, can they leave a voicemail: [x] Yes [] No    Yu Do   06/21/24 08:30 EDT

## 2024-07-08 ENCOUNTER — PATIENT ROUNDING (BHMG ONLY) (OUTPATIENT)
Dept: FAMILY MEDICINE CLINIC | Facility: CLINIC | Age: 54
End: 2024-07-08
Payer: MEDICARE

## 2024-07-08 ENCOUNTER — OFFICE VISIT (OUTPATIENT)
Dept: FAMILY MEDICINE CLINIC | Facility: CLINIC | Age: 54
End: 2024-07-08
Payer: MEDICARE

## 2024-07-08 VITALS
DIASTOLIC BLOOD PRESSURE: 80 MMHG | HEART RATE: 92 BPM | WEIGHT: 222 LBS | BODY MASS INDEX: 36.99 KG/M2 | HEIGHT: 65 IN | OXYGEN SATURATION: 96 % | SYSTOLIC BLOOD PRESSURE: 120 MMHG

## 2024-07-08 DIAGNOSIS — E78.00 HYPERCHOLESTEROLEMIA: ICD-10-CM

## 2024-07-08 DIAGNOSIS — D75.839 THROMBOCYTOSIS: ICD-10-CM

## 2024-07-08 DIAGNOSIS — R73.9 HYPERGLYCEMIA: ICD-10-CM

## 2024-07-08 DIAGNOSIS — E05.90 HYPERTHYROIDISM: ICD-10-CM

## 2024-07-08 DIAGNOSIS — Z00.00 MEDICARE ANNUAL WELLNESS VISIT, SUBSEQUENT: Primary | ICD-10-CM

## 2024-07-08 PROCEDURE — 1159F MED LIST DOCD IN RCRD: CPT | Performed by: STUDENT IN AN ORGANIZED HEALTH CARE EDUCATION/TRAINING PROGRAM

## 2024-07-08 PROCEDURE — 36415 COLL VENOUS BLD VENIPUNCTURE: CPT | Performed by: STUDENT IN AN ORGANIZED HEALTH CARE EDUCATION/TRAINING PROGRAM

## 2024-07-08 PROCEDURE — G0439 PPPS, SUBSEQ VISIT: HCPCS | Performed by: STUDENT IN AN ORGANIZED HEALTH CARE EDUCATION/TRAINING PROGRAM

## 2024-07-08 PROCEDURE — 1160F RVW MEDS BY RX/DR IN RCRD: CPT | Performed by: STUDENT IN AN ORGANIZED HEALTH CARE EDUCATION/TRAINING PROGRAM

## 2024-07-08 NOTE — PROGRESS NOTES
A [facility] message has been sent to the patient for [place] [facility] [place]

## 2024-07-08 NOTE — PROGRESS NOTES
Venipuncture Blood Specimen Collection  Venipuncture performed in left arm by Jacqueline Mijares MA with good hemostasis. Patient tolerated the procedure well without complications.   07/08/24   Jacqueline Mijares MA

## 2024-07-08 NOTE — PROGRESS NOTES
The ABCs of the Annual Wellness Visit  Subsequent Medicare Wellness Visit    Chief Complaint   Patient presents with    Medicare Wellness-subsequent      Subjective    History of Present Illness:  Emily Garcia is a 53 y.o. female who presents for a Subsequent Medicare Wellness Visit.    The following portions of the patient's history were reviewed and   updated as appropriate: allergies, current medications, past family history, past medical history, past social history, past surgical history, and problem list.    Compared to one year ago, the patient feels her physical   health is the same.    Compared to one year ago, the patient feels her mental   health is the same.    Recent Hospitalizations:  She was not admitted to the hospital during the last year.       Current Medical Providers:  Patient Care Team:  Andrae Shore DO as PCP - General (Family Medicine)    Outpatient Medications Prior to Visit   Medication Sig Dispense Refill    atorvastatin (LIPITOR) 10 MG tablet TAKE ONE TABLET BY MOUTH ONCE DAILY 90 tablet 1    Combivent Respimat  MCG/ACT inhaler INHALE 1 PUFF BY MOUTH FOUR TIMES DAILY AS NEEDED FOR WHEEZING 4 g 6    diazePAM (Valium) 2 MG tablet Take 1 tablet by mouth 2 (Two) Times a Day As Needed for Anxiety. 10 tablet 0    fluticasone (FLOVENT HFA) 110 MCG/ACT inhaler INHALE 1 PUFF BY MOUTH TWICE DAILY 12 g 3    ibuprofen (ADVIL,MOTRIN) 200 MG tablet Take 1 tablet by mouth Every 6 (Six) Hours As Needed for Mild Pain.      omeprazole (priLOSEC) 20 MG capsule Take 1 capsule by mouth Daily. 30 capsule 2    QUEtiapine Fumarate 150 MG tablet Take 150 mg by mouth Every Night. 30 tablet 2    venlafaxine XR (EFFEXOR-XR) 37.5 MG 24 hr capsule TAKE 1 CAPSULE BY MOUTH DAILY 30 capsule 2    Vraylar 1.5 MG capsule capsule TAKE 1 CAPSULE BY MOUTH DAILY 30 capsule 2    phentermine (ADIPEX-P) 37.5 MG tablet Take 1 tablet by mouth Every Morning Before Breakfast. 30 tablet 0    varenicline (CHANTIX) 1 MG tablet  "TAKE 1 TABLET BY MOUTH TWICE DAILY 56 tablet 1     No facility-administered medications prior to visit.       No opioid medication identified on active medication list. I have reviewed chart for other potential  high risk medication/s and harmful drug interactions in the elderly.        Aspirin is not on active medication list.  Aspirin use is not indicated based on review of current medical condition/s. Risk of harm outweighs potential benefits.  .    Patient Active Problem List   Diagnosis    Abnormal TSH    Anxiety    COPD (chronic obstructive pulmonary disease)    Depression    OCD (obsessive compulsive disorder)    PTSD (post-traumatic stress disorder)    Class 2 obesity due to excess calories without serious comorbidity with body mass index (BMI) of 35.0 to 35.9 in adult    Hypercholesterolemia    DDD (degenerative disc disease), lumbar    Spinal stenosis of lumbar region with neurogenic claudication     Advance Care Planning  Advance Directive is not on file.  ACP discussion was held with the patient during this visit. Patient does not have an advance directive, information provided.          Objective    Vitals:    07/08/24 1049   BP: 120/80   BP Location: Left arm   Patient Position: Sitting   Pulse: 92   SpO2: 96%   Weight: 101 kg (222 lb)   Height: 165.1 cm (65\")     Estimated body mass index is 36.94 kg/m² as calculated from the following:    Height as of this encounter: 165.1 cm (65\").    Weight as of this encounter: 101 kg (222 lb).           Does the patient have evidence of cognitive impairment? No, Mini mental exam was completed.        Physical Exam  Vitals reviewed.   Constitutional:       General: She is not in acute distress.     Appearance: She is not ill-appearing.   Cardiovascular:      Rate and Rhythm: Normal rate and regular rhythm.   Pulmonary:      Effort: Pulmonary effort is normal.      Breath sounds: Normal breath sounds.   Neurological:      Mental Status: She is alert.   Psychiatric: "         Mood and Affect: Mood normal.         Behavior: Behavior normal.         Thought Content: Thought content normal.         Judgment: Judgment normal.       Lab Results   Component Value Date    CHLPL 216 (H) 2024    TRIG 142 2024    HDL 56 2024     (H) 2024    VLDL 25 2024    HGBA1C 5.80 (H) 2024            HEALTH RISK ASSESSMENT    Smoking Status:  Social History     Tobacco Use   Smoking Status Every Day    Current packs/day: 1.00    Average packs/day: 1 pack/day for 30.0 years (30.0 ttl pk-yrs)    Types: Cigarettes    Passive exposure: Past   Smokeless Tobacco Never     Alcohol Consumption:  Social History     Substance and Sexual Activity   Alcohol Use Yes    Comment: quit alcohol in march     Fall Risk Screen:    AROLDO Fall Risk Assessment was completed, and patient is at LOW risk for falls.Assessment completed on:2024    Depression Screenin/8/2024    10:53 AM   PHQ-2/PHQ-9 Depression Screening   Little Interest or Pleasure in Doing Things 0-->not at all   Feeling Down, Depressed or Hopeless 1-->several days   PHQ-9: Brief Depression Severity Measure Score 1       Health Habits and Functional and Cognitive Screenin/6/2024    11:29 AM   Functional & Cognitive Status   Do you have difficulty preparing food and eating? No   Do you have difficulty bathing yourself, getting dressed or grooming yourself? No   Do you have difficulty using the toilet? No   Do you have difficulty moving around from place to place? No   Do you have trouble with steps or getting out of a bed or a chair? No   Current Diet Unhealthy Diet   Dental Exam Not up to date   Eye Exam Not up to date   Exercise (times per week) Other   Current Exercises Include No Regular Exercise   Do you need help using the phone?  No   Are you deaf or do you have serious difficulty hearing?  No   Do you need help to go to places out of walking distance? No   Do you need help shopping? No    Do you need help preparing meals?  No   Do you need help with housework?  No   Do you need help with laundry? No   Do you need help taking your medications? No   Do you need help managing money? No   Do you ever drive or ride in a car without wearing a seat belt? No   Have you felt unusual stress, anger or loneliness in the last month? No   Who do you live with? Alone   If you need help, do you have trouble finding someone available to you? No   Have you been bothered in the last four weeks by sexual problems? No   Do you have difficulty concentrating, remembering or making decisions? Yes       Age-appropriate Screening Schedule:  Refer to the list below for future screening recommendations based on patient's age, sex and/or medical conditions. Orders for these recommended tests are listed in the plan section. The patient has been provided with a written plan.    Health Maintenance   Topic Date Due    MAMMOGRAM  Never done    Pneumococcal Vaccine 0-64 (1 of 2 - PCV) Never done    ZOSTER VACCINE (1 of 2) Never done    ANNUAL WELLNESS VISIT  Never done    TDAP/TD VACCINES (2 - Td or Tdap) 05/28/2022    COVID-19 Vaccine (1 - 2023-24 season) Never done    BMI FOLLOWUP  03/09/2024    LUNG CANCER SCREENING  09/28/2024    INFLUENZA VACCINE  08/01/2024    LIPID PANEL  07/08/2025    COLORECTAL CANCER SCREENING  06/01/2030    HEPATITIS C SCREENING  Completed              Assessment & Plan   CMS Preventative Services Quick Reference  Risk Factors Identified During Encounter  None Identified  The above risks/problems have been discussed with the patient.  Follow up actions/plans if indicated are seen below in the Assessment/Plan Section.  Pertinent information has been shared with the patient in the After Visit Summary.    Diagnoses and all orders for this visit:    1. Medicare annual wellness visit, subsequent (Primary)  -     Cancel: Hemoglobin A1c; Future  -     Cancel: Comprehensive Metabolic Panel; Future  -     Cancel:  Lipid Panel; Future  -     Cancel: TSH; Future  -     Cancel: T4, free; Future  -     Cancel: T3, free; Future  -     Cancel: CBC w AUTO Differential; Future    2. Thrombocytosis  -     Cancel: CBC w AUTO Differential; Future  -     CBC w AUTO Differential    3. Hypercholesterolemia  -     Cancel: Comprehensive Metabolic Panel; Future  -     Cancel: Lipid Panel; Future  -     Comprehensive Metabolic Panel  -     Lipid Panel    4. Hyperglycemia  -     Cancel: Hemoglobin A1c; Future  -     Hemoglobin A1c    5. Hyperthyroidism  -     Cancel: TSH; Future  -     Cancel: T4, free; Future  -     Cancel: T3, free; Future  -     T3, free  -     T4, free  -     TSH        Follow Up:   Return in about 6 months (around 1/8/2025).     An After Visit Summary and PPPS were made available to the patient.

## 2024-07-09 LAB
ALBUMIN SERPL-MCNC: 4.7 G/DL (ref 3.5–5.2)
ALBUMIN/GLOB SERPL: 2.1 G/DL
ALP SERPL-CCNC: 112 U/L (ref 39–117)
ALT SERPL-CCNC: 34 U/L (ref 1–33)
AST SERPL-CCNC: 22 U/L (ref 1–32)
BASOPHILS # BLD AUTO: 0.03 10*3/MM3 (ref 0–0.2)
BASOPHILS NFR BLD AUTO: 0.4 % (ref 0–1.5)
BILIRUB SERPL-MCNC: <0.2 MG/DL (ref 0–1.2)
BUN SERPL-MCNC: 8 MG/DL (ref 6–20)
BUN/CREAT SERPL: 8.4 (ref 7–25)
CALCIUM SERPL-MCNC: 10.2 MG/DL (ref 8.6–10.5)
CHLORIDE SERPL-SCNC: 104 MMOL/L (ref 98–107)
CHOLEST SERPL-MCNC: 216 MG/DL (ref 0–200)
CO2 SERPL-SCNC: 25.5 MMOL/L (ref 22–29)
CREAT SERPL-MCNC: 0.95 MG/DL (ref 0.57–1)
EGFRCR SERPLBLD CKD-EPI 2021: 71.3 ML/MIN/1.73
EOSINOPHIL # BLD AUTO: 0.06 10*3/MM3 (ref 0–0.4)
EOSINOPHIL NFR BLD AUTO: 0.9 % (ref 0.3–6.2)
ERYTHROCYTE [DISTWIDTH] IN BLOOD BY AUTOMATED COUNT: 13.7 % (ref 12.3–15.4)
GLOBULIN SER CALC-MCNC: 2.2 GM/DL
GLUCOSE SERPL-MCNC: 102 MG/DL (ref 65–99)
HBA1C MFR BLD: 5.8 % (ref 4.8–5.6)
HCT VFR BLD AUTO: 39 % (ref 34–46.6)
HDLC SERPL-MCNC: 56 MG/DL (ref 40–60)
HGB BLD-MCNC: 13.2 G/DL (ref 12–15.9)
IMM GRANULOCYTES # BLD AUTO: 0.04 10*3/MM3 (ref 0–0.05)
IMM GRANULOCYTES NFR BLD AUTO: 0.6 % (ref 0–0.5)
LDLC SERPL CALC-MCNC: 135 MG/DL (ref 0–100)
LYMPHOCYTES # BLD AUTO: 1.9 10*3/MM3 (ref 0.7–3.1)
LYMPHOCYTES NFR BLD AUTO: 27.6 % (ref 19.6–45.3)
MCH RBC QN AUTO: 31.1 PG (ref 26.6–33)
MCHC RBC AUTO-ENTMCNC: 33.8 G/DL (ref 31.5–35.7)
MCV RBC AUTO: 91.8 FL (ref 79–97)
MONOCYTES # BLD AUTO: 0.35 10*3/MM3 (ref 0.1–0.9)
MONOCYTES NFR BLD AUTO: 5.1 % (ref 5–12)
NEUTROPHILS # BLD AUTO: 4.51 10*3/MM3 (ref 1.7–7)
NEUTROPHILS NFR BLD AUTO: 65.4 % (ref 42.7–76)
NRBC BLD AUTO-RTO: 0 /100 WBC (ref 0–0.2)
PLATELET # BLD AUTO: 374 10*3/MM3 (ref 140–450)
POTASSIUM SERPL-SCNC: 4.6 MMOL/L (ref 3.5–5.2)
PROT SERPL-MCNC: 6.9 G/DL (ref 6–8.5)
RBC # BLD AUTO: 4.25 10*6/MM3 (ref 3.77–5.28)
SODIUM SERPL-SCNC: 143 MMOL/L (ref 136–145)
T3FREE SERPL-MCNC: 2.6 PG/ML (ref 2–4.4)
T4 FREE SERPL-MCNC: 0.91 NG/DL (ref 0.92–1.68)
TRIGL SERPL-MCNC: 142 MG/DL (ref 0–150)
TSH SERPL DL<=0.005 MIU/L-ACNC: 1.11 UIU/ML (ref 0.27–4.2)
VLDLC SERPL CALC-MCNC: 25 MG/DL (ref 5–40)
WBC # BLD AUTO: 6.89 10*3/MM3 (ref 3.4–10.8)

## 2024-07-12 RX ORDER — ATORVASTATIN CALCIUM 20 MG/1
20 TABLET, FILM COATED ORAL DAILY
Qty: 30 TABLET | Refills: 2 | Status: SHIPPED | OUTPATIENT
Start: 2024-07-12

## 2024-07-13 DIAGNOSIS — F33.2 SEVERE EPISODE OF RECURRENT MAJOR DEPRESSIVE DISORDER, WITHOUT PSYCHOTIC FEATURES: ICD-10-CM

## 2024-07-13 DIAGNOSIS — F43.10 PTSD (POST-TRAUMATIC STRESS DISORDER): ICD-10-CM

## 2024-07-15 RX ORDER — CARIPRAZINE 1.5 MG/1
1.5 CAPSULE, GELATIN COATED ORAL DAILY
Qty: 30 CAPSULE | Refills: 2 | Status: SHIPPED | OUTPATIENT
Start: 2024-07-15

## 2024-07-17 DIAGNOSIS — F33.2 SEVERE EPISODE OF RECURRENT MAJOR DEPRESSIVE DISORDER, WITHOUT PSYCHOTIC FEATURES: ICD-10-CM

## 2024-07-17 DIAGNOSIS — F43.10 PTSD (POST-TRAUMATIC STRESS DISORDER): ICD-10-CM

## 2024-07-17 RX ORDER — VENLAFAXINE HYDROCHLORIDE 37.5 MG/1
37.5 CAPSULE, EXTENDED RELEASE ORAL DAILY
Qty: 30 CAPSULE | Refills: 2 | Status: SHIPPED | OUTPATIENT
Start: 2024-07-17

## 2024-08-05 DIAGNOSIS — E78.00 HYPERCHOLESTEROLEMIA: ICD-10-CM

## 2024-08-05 RX ORDER — ATORVASTATIN CALCIUM 10 MG/1
10 TABLET, FILM COATED ORAL DAILY
Qty: 90 TABLET | Refills: 1 | OUTPATIENT
Start: 2024-08-05

## 2024-08-05 RX ORDER — ATORVASTATIN CALCIUM 20 MG/1
20 TABLET, FILM COATED ORAL DAILY
Qty: 30 TABLET | Refills: 2 | Status: SHIPPED | OUTPATIENT
Start: 2024-08-05

## 2024-09-17 DIAGNOSIS — F43.10 PTSD (POST-TRAUMATIC STRESS DISORDER): ICD-10-CM

## 2024-09-17 DIAGNOSIS — F51.01 PRIMARY INSOMNIA: ICD-10-CM

## 2024-09-17 RX ORDER — QUETIAPINE FUMARATE 150 MG/1
1 TABLET, FILM COATED ORAL NIGHTLY
Qty: 30 TABLET | Refills: 2 | Status: SHIPPED | OUTPATIENT
Start: 2024-09-17

## 2024-10-08 DIAGNOSIS — K21.9 GASTROESOPHAGEAL REFLUX DISEASE WITHOUT ESOPHAGITIS: ICD-10-CM

## 2024-10-08 NOTE — TELEPHONE ENCOUNTER
Rx Refill Note  Requested Prescriptions     Pending Prescriptions Disp Refills    omeprazole (priLOSEC) 20 MG capsule [Pharmacy Med Name: OMEPRAZOLE 20MG CAPSULES] 30 capsule 2     Sig: TAKE 1 CAPSULE BY MOUTH DAILY      Last office visit with prescribing clinician: 7/8/2024   Last telemedicine visit with prescribing clinician: Visit date not found   Next office visit with prescribing clinician: 1/9/2025

## 2024-11-04 DIAGNOSIS — F33.2 SEVERE EPISODE OF RECURRENT MAJOR DEPRESSIVE DISORDER, WITHOUT PSYCHOTIC FEATURES: ICD-10-CM

## 2024-11-04 DIAGNOSIS — F43.10 PTSD (POST-TRAUMATIC STRESS DISORDER): ICD-10-CM

## 2024-11-04 NOTE — TELEPHONE ENCOUNTER
Rx Refill Note  Requested Prescriptions     Pending Prescriptions Disp Refills    ibuprofen (ADVIL,MOTRIN) 200 MG tablet       Sig: Take 1 tablet by mouth Every 6 (Six) Hours As Needed for Mild Pain.      Last office visit with prescribing clinician: 7/8/2024   Last telemedicine visit with prescribing clinician: Visit date not found   Next office visit with prescribing clinician: 11/22/24  {

## 2024-11-04 NOTE — TELEPHONE ENCOUNTER
Rx Refill Note  Requested Prescriptions     Pending Prescriptions Disp Refills    diazePAM (Valium) 2 MG tablet 10 tablet 0     Sig: Take 1 tablet by mouth 2 (Two) Times a Day As Needed for Anxiety.      Last office visit with prescribing clinician: 7/8/2024   Last telemedicine visit with prescribing clinician: Visit date not found   Next office visit with prescribing clinician: 11/22/2024

## 2024-11-07 RX ORDER — IBUPROFEN 200 MG
200 TABLET ORAL EVERY 6 HOURS PRN
Qty: 120 TABLET | Refills: 2 | Status: SHIPPED | OUTPATIENT
Start: 2024-11-07

## 2024-11-07 RX ORDER — DIAZEPAM 2 MG/1
2 TABLET ORAL 2 TIMES DAILY PRN
Qty: 10 TABLET | Refills: 0 | Status: SHIPPED | OUTPATIENT
Start: 2024-11-07

## 2024-11-20 DIAGNOSIS — E78.00 HYPERCHOLESTEROLEMIA: ICD-10-CM

## 2024-11-21 RX ORDER — ATORVASTATIN CALCIUM 20 MG/1
20 TABLET, FILM COATED ORAL DAILY
Qty: 30 TABLET | Refills: 2 | Status: SHIPPED | OUTPATIENT
Start: 2024-11-21

## 2024-11-27 DIAGNOSIS — F43.10 PTSD (POST-TRAUMATIC STRESS DISORDER): ICD-10-CM

## 2024-11-27 DIAGNOSIS — F33.2 SEVERE EPISODE OF RECURRENT MAJOR DEPRESSIVE DISORDER, WITHOUT PSYCHOTIC FEATURES: ICD-10-CM

## 2024-11-27 NOTE — TELEPHONE ENCOUNTER
Rx Refill Note  Requested Prescriptions     Pending Prescriptions Disp Refills    diazePAM (VALIUM) 2 MG tablet [Pharmacy Med Name: DIAZEPAM 2MG TABLETS] 10 tablet      Sig: TAKE 1 TABLET BY MOUTH TWICE DAILY AS NEEDED FOR ANXIETY      Last office visit with prescribing clinician: 7/8/2024   Last telemedicine visit with prescribing clinician: Visit date not found   Next office visit with prescribing clinician: 12/17/2024

## 2024-12-01 RX ORDER — DIAZEPAM 2 MG/1
2 TABLET ORAL 2 TIMES DAILY PRN
Qty: 10 TABLET | Refills: 0 | Status: SHIPPED | OUTPATIENT
Start: 2024-12-01

## 2024-12-27 ENCOUNTER — TELEPHONE (OUTPATIENT)
Dept: FAMILY MEDICINE CLINIC | Facility: CLINIC | Age: 54
End: 2024-12-27
Payer: MEDICARE

## 2024-12-27 DIAGNOSIS — Z12.31 ENCOUNTER FOR SCREENING MAMMOGRAM FOR BREAST CANCER: Primary | ICD-10-CM

## 2024-12-29 DIAGNOSIS — F43.10 PTSD (POST-TRAUMATIC STRESS DISORDER): ICD-10-CM

## 2024-12-29 DIAGNOSIS — F33.2 SEVERE EPISODE OF RECURRENT MAJOR DEPRESSIVE DISORDER, WITHOUT PSYCHOTIC FEATURES: ICD-10-CM

## 2024-12-30 DIAGNOSIS — F51.01 PRIMARY INSOMNIA: ICD-10-CM

## 2024-12-30 DIAGNOSIS — F43.10 PTSD (POST-TRAUMATIC STRESS DISORDER): ICD-10-CM

## 2024-12-30 RX ORDER — DIAZEPAM 2 MG/1
2 TABLET ORAL 2 TIMES DAILY PRN
Qty: 10 TABLET | OUTPATIENT
Start: 2024-12-30

## 2024-12-30 RX ORDER — QUETIAPINE FUMARATE 150 MG/1
1 TABLET, FILM COATED ORAL NIGHTLY
Qty: 30 TABLET | Refills: 2 | Status: SHIPPED | OUTPATIENT
Start: 2024-12-30

## 2024-12-31 RX ORDER — DIAZEPAM 2 MG/1
2 TABLET ORAL 2 TIMES DAILY PRN
Qty: 10 TABLET | Refills: 0 | Status: SHIPPED | OUTPATIENT
Start: 2024-12-31

## 2025-01-24 DIAGNOSIS — F33.2 SEVERE EPISODE OF RECURRENT MAJOR DEPRESSIVE DISORDER, WITHOUT PSYCHOTIC FEATURES: ICD-10-CM

## 2025-01-24 DIAGNOSIS — F43.10 PTSD (POST-TRAUMATIC STRESS DISORDER): ICD-10-CM

## 2025-01-27 RX ORDER — DIAZEPAM 2 MG/1
2 TABLET ORAL 2 TIMES DAILY PRN
Qty: 10 TABLET | OUTPATIENT
Start: 2025-01-27

## 2025-01-27 NOTE — TELEPHONE ENCOUNTER
Rx Refill Note  Requested Prescriptions     Pending Prescriptions Disp Refills    diazePAM (VALIUM) 2 MG tablet [Pharmacy Med Name: DIAZEPAM 2MG TABLETS] 10 tablet      Sig: TAKE 1 TABLET BY MOUTH TWICE DAILY AS NEEDED FOR ANXIETY      Last office visit with prescribing clinician: 7/8/2024   Last telemedicine visit with prescribing clinician: Visit date not found   Next office visit with prescribing clinician: Visit date not found

## 2025-02-02 DIAGNOSIS — F43.10 PTSD (POST-TRAUMATIC STRESS DISORDER): ICD-10-CM

## 2025-02-02 DIAGNOSIS — F33.2 SEVERE EPISODE OF RECURRENT MAJOR DEPRESSIVE DISORDER, WITHOUT PSYCHOTIC FEATURES: ICD-10-CM

## 2025-02-03 RX ORDER — DIAZEPAM 2 MG/1
2 TABLET ORAL 2 TIMES DAILY PRN
Qty: 10 TABLET | OUTPATIENT
Start: 2025-02-03

## 2025-03-05 ENCOUNTER — OFFICE VISIT (OUTPATIENT)
Dept: FAMILY MEDICINE CLINIC | Facility: CLINIC | Age: 55
End: 2025-03-05
Payer: MEDICARE

## 2025-03-05 VITALS
DIASTOLIC BLOOD PRESSURE: 92 MMHG | OXYGEN SATURATION: 97 % | HEIGHT: 65 IN | BODY MASS INDEX: 36.32 KG/M2 | SYSTOLIC BLOOD PRESSURE: 138 MMHG | HEART RATE: 88 BPM | WEIGHT: 218 LBS

## 2025-03-05 DIAGNOSIS — E78.00 HYPERCHOLESTEROLEMIA: ICD-10-CM

## 2025-03-05 DIAGNOSIS — R73.03 PREDIABETES: ICD-10-CM

## 2025-03-05 DIAGNOSIS — M25.512 CHRONIC LEFT SHOULDER PAIN: ICD-10-CM

## 2025-03-05 DIAGNOSIS — F33.2 SEVERE EPISODE OF RECURRENT MAJOR DEPRESSIVE DISORDER, WITHOUT PSYCHOTIC FEATURES: ICD-10-CM

## 2025-03-05 DIAGNOSIS — E03.9 ACQUIRED HYPOTHYROIDISM: ICD-10-CM

## 2025-03-05 DIAGNOSIS — F51.01 PRIMARY INSOMNIA: ICD-10-CM

## 2025-03-05 DIAGNOSIS — F43.10 PTSD (POST-TRAUMATIC STRESS DISORDER): Primary | ICD-10-CM

## 2025-03-05 DIAGNOSIS — I10 PRIMARY HYPERTENSION: ICD-10-CM

## 2025-03-05 DIAGNOSIS — G89.29 CHRONIC LEFT SHOULDER PAIN: ICD-10-CM

## 2025-03-05 DIAGNOSIS — F42.2 MIXED OBSESSIONAL THOUGHTS AND ACTS: ICD-10-CM

## 2025-03-05 PROCEDURE — 1160F RVW MEDS BY RX/DR IN RCRD: CPT | Performed by: STUDENT IN AN ORGANIZED HEALTH CARE EDUCATION/TRAINING PROGRAM

## 2025-03-05 PROCEDURE — 99214 OFFICE O/P EST MOD 30 MIN: CPT | Performed by: STUDENT IN AN ORGANIZED HEALTH CARE EDUCATION/TRAINING PROGRAM

## 2025-03-05 PROCEDURE — 1159F MED LIST DOCD IN RCRD: CPT | Performed by: STUDENT IN AN ORGANIZED HEALTH CARE EDUCATION/TRAINING PROGRAM

## 2025-03-05 PROCEDURE — 36415 COLL VENOUS BLD VENIPUNCTURE: CPT | Performed by: STUDENT IN AN ORGANIZED HEALTH CARE EDUCATION/TRAINING PROGRAM

## 2025-03-05 RX ORDER — DIAZEPAM 2 MG/1
2 TABLET ORAL 2 TIMES DAILY PRN
Qty: 10 TABLET | Refills: 0 | Status: SHIPPED | OUTPATIENT
Start: 2025-03-05

## 2025-03-05 RX ORDER — HYDROCHLOROTHIAZIDE 12.5 MG/1
12.5 TABLET ORAL DAILY
Qty: 30 TABLET | Refills: 2 | Status: SHIPPED | OUTPATIENT
Start: 2025-03-05

## 2025-03-05 RX ORDER — MELOXICAM 15 MG/1
15 TABLET ORAL DAILY
Qty: 30 TABLET | Refills: 0 | Status: SHIPPED | OUTPATIENT
Start: 2025-03-05

## 2025-03-05 RX ORDER — QUETIAPINE FUMARATE 200 MG/1
200 TABLET, FILM COATED ORAL NIGHTLY
Qty: 30 TABLET | Refills: 1 | Status: SHIPPED | OUTPATIENT
Start: 2025-03-05

## 2025-03-05 RX ORDER — ATORVASTATIN CALCIUM 20 MG/1
20 TABLET, FILM COATED ORAL DAILY
Qty: 30 TABLET | Refills: 2 | Status: SHIPPED | OUTPATIENT
Start: 2025-03-05

## 2025-03-05 NOTE — PROGRESS NOTES
Andrae Shore,   [facility]  [address]  [place] [id number] [phone]    Subjective      Name Emily Garcia MRN [id number]    1970 AGE/SEX 54 y.o. / female      Chief Complaint Chief Complaint   Patient presents with    Numbness     Having issues with hand swelling and numbness. Has been having issues for the last few months     Hyperlipidemia    Anxiety     6 month check up     Fatigue     Having issues with fatigue          Visit History for  2025    Emily Garcia is a 54 y.o. female who presented today for Numbness (Having issues with hand swelling and numbness. Has been having issues for the last few months ), Hyperlipidemia, Anxiety (6 month check up ), and Fatigue (Having issues with fatigue )       History of Present Illness  She reports experiencing bilateral hand pain and swelling, along with wrist discomfort, persisting for several months. Initially, the pain was severe, radiating into her arms, elbows, and shoulders. Although the intensity has since decreased, she continues to experience numbness and aching in her hands. She also reports difficulty sleeping due to the pain. Ibuprofen has not been effective in managing her symptoms.    She experiences intermittent chest pain, which she attributes to anxiety. She has a history of PTSD and OCD and has previously consulted a psychiatrist, although not in recent years. She is not currently engaged in therapy but expresses interest in online sessions due to difficulty leaving her home. She missed two appointments this winter, one in December and another in January. She reports significant anxiety and fear, including a fear of being hunted. She has tried Lexapro, Zoloft, Prozac, and Vraylar, but found them ineffective or experienced adverse reactions. She has not tried Trintellix. She has also tried ashwagandha but did not find it beneficial. She has not attempted daytime use of Seroquel due to  "its sedative effects. She requests a refill of her Valium prescription, which she uses sparingly. She is not currently on any anti-anxiety medication, having discontinued Effexor due to adverse effects. She is currently on Seroquel, which she takes at night.    She sustained an injury to her left shoulder a few years ago, which she did not seek medical attention for. She describes the sensation as if the shoulder was dislocated, and she attempted to reposition it herself. The pain has been chronic, worsening over time. She has previously tried meloxicam without benefit.    She reports fatigue and weight gain, despite maintaining a low-salt diet.    MEDICATIONS  Current: Seroquel, Valium  Past: Effexor, Zoloft, Lexapro, Prozac, Vraylar, meloxicam       Medications and Allergies   Current Outpatient Medications   Medication Instructions    atorvastatin (LIPITOR) 20 mg, Oral, Daily    Combivent Respimat  MCG/ACT inhaler INHALE 1 PUFF BY MOUTH FOUR TIMES DAILY AS NEEDED FOR WHEEZING    diazePAM (VALIUM) 2 mg, Oral, 2 Times Daily PRN, for anxiety    fluticasone (FLOVENT HFA) 110 MCG/ACT inhaler 1 puff, Inhalation, 2 Times Daily    hydroCHLOROthiazide 12.5 mg, Oral, Daily    meloxicam (MOBIC) 15 mg, Oral, Daily    omeprazole (PRILOSEC) 20 mg, Oral, Daily    QUEtiapine (SEROQUEL) 200 mg, Oral, Nightly    Vortioxetine HBr (TRINTELLIX) 10 mg, Oral, Daily With Breakfast     No Known Allergies   I have reviewed the above medications and allergies     Objective:      Vitals Vitals:    03/05/25 1028   BP: 138/92   BP Location: Left arm   Patient Position: Sitting   Cuff Size: Adult   Pulse: 88   SpO2: 97%   Weight: 98.9 kg (218 lb)   Height: 165.1 cm (65\")     Body mass index is 36.28 kg/m².    Physical Exam  Vitals reviewed.   Constitutional:       General: She is not in acute distress.     Appearance: She is not ill-appearing.   Pulmonary:      Effort: Pulmonary effort is normal.   Musculoskeletal:      Comments: " Negative prayer sign and inverse Phalen's test. Positive Tinel's sign. External rotation is restricted.   Psychiatric:         Mood and Affect: Mood normal.         Behavior: Behavior normal.         Thought Content: Thought content normal.         Judgment: Judgment normal.          Physical Exam       Results       Assessment/Plan   Issues Addressed/ Plan   Diagnosis Plan   1. PTSD (post-traumatic stress disorder)  Vortioxetine HBr (Trintellix) 10 MG tablet tablet    QUEtiapine (SEROquel) 200 MG tablet    diazePAM (VALIUM) 2 MG tablet      2. Severe episode of recurrent major depressive disorder, without psychotic features  Vortioxetine HBr (Trintellix) 10 MG tablet tablet    QUEtiapine (SEROquel) 200 MG tablet    diazePAM (VALIUM) 2 MG tablet      3. Mixed obsessional thoughts and acts  Vortioxetine HBr (Trintellix) 10 MG tablet tablet    QUEtiapine (SEROquel) 200 MG tablet      4. Primary insomnia  QUEtiapine (SEROquel) 200 MG tablet      5. Primary hypertension  hydroCHLOROthiazide 12.5 MG tablet      6. Acquired hypothyroidism  TSH    T4, free    T3, free      7. Prediabetes  Hemoglobin A1c      8. Hypercholesterolemia  Lipid panel      9. Chronic left shoulder pain  meloxicam (MOBIC) 15 MG tablet         Assessment & Plan  1. Bilateral hand pain.  Symptoms suggest carpal tunnel syndrome, confirmed by a positive Tinel's sign. A diuretic will be prescribed to manage the associated swelling and elevated blood pressure. She is advised to wear braces at night and perform specific stretches to alleviate symptoms. A blood test will be conducted today to monitor her condition. If the diuretic does not alleviate the swelling, she should monitor her salt intake.    2. Anxiety.  She has previously tried SSRIs and Effexor without success. A MIGSIF test will be conducted to identify the most suitable medication for her genetic profile. She will be started on Trintellix 10 mg once daily. The dosage of Seroquel will be  increased to 200 mg once daily. A refill of her Valium prescription will be provided, and she is advised to limit its use. If the current treatment regimen proves ineffective, a referral to a psychiatrist will be considered.    3. Left shoulder pain.  The pain may be due to arthritis in the acromion joint. Meloxicam 15 mg will be prescribed for a short duration to manage the inflammation. She is advised to take the medication with food. If there is no improvement in her condition, a referral to orthopedics will be considered.    4. Fatigue.  Her fatigue may be due to low thyroid levels. A blood test will be conducted today to check her thyroid levels. If the levels are low, a low-dose thyroid medication will be started.    Follow-up  The patient will follow up in 1 month.        There are no Patient Instructions on file for this visit.   Follow up  recommended Return in about 1 month (around 4/5/2025) for anxiety.   - Dragon voice recognition software was utilized to complete this chart.  Every reasonable attempt was made to edit and correct the text, however some incorrect words may remain.   Andrae Shore DO    Patient or patient representative verbalized consent for the use of Ambient Listening during the visit with  Andrae Shore DO for chart documentation. 3/24/2025  00:18 EDT

## 2025-03-05 NOTE — PROGRESS NOTES
Venipuncture Blood Specimen Collection  Venipuncture performed in left arm by Jacqueline Mijares MA with good hemostasis. Patient tolerated the procedure well without complications.   03/05/25   Jacqueline Mijares MA

## 2025-03-06 LAB
CHOLEST SERPL-MCNC: 213 MG/DL (ref 0–200)
HBA1C MFR BLD: 5.9 % (ref 4.8–5.6)
HDLC SERPL-MCNC: 55 MG/DL (ref 40–60)
LDLC SERPL CALC-MCNC: 132 MG/DL (ref 0–100)
T3FREE SERPL-MCNC: 3.3 PG/ML (ref 2–4.4)
T4 FREE SERPL-MCNC: 0.94 NG/DL (ref 0.92–1.68)
TRIGL SERPL-MCNC: 146 MG/DL (ref 0–150)
TSH SERPL DL<=0.005 MIU/L-ACNC: 1.29 UIU/ML (ref 0.27–4.2)
VLDLC SERPL CALC-MCNC: 26 MG/DL (ref 5–40)

## 2025-04-01 DIAGNOSIS — E78.00 HYPERCHOLESTEROLEMIA: ICD-10-CM

## 2025-04-01 DIAGNOSIS — F43.10 PTSD (POST-TRAUMATIC STRESS DISORDER): ICD-10-CM

## 2025-04-01 DIAGNOSIS — M25.512 CHRONIC LEFT SHOULDER PAIN: ICD-10-CM

## 2025-04-01 DIAGNOSIS — F33.2 SEVERE EPISODE OF RECURRENT MAJOR DEPRESSIVE DISORDER, WITHOUT PSYCHOTIC FEATURES: ICD-10-CM

## 2025-04-01 DIAGNOSIS — G89.29 CHRONIC LEFT SHOULDER PAIN: ICD-10-CM

## 2025-04-01 RX ORDER — ATORVASTATIN CALCIUM 20 MG/1
20 TABLET, FILM COATED ORAL DAILY
Qty: 30 TABLET | Refills: 2 | Status: SHIPPED | OUTPATIENT
Start: 2025-04-01

## 2025-04-01 RX ORDER — MELOXICAM 15 MG/1
15 TABLET ORAL DAILY
Qty: 30 TABLET | Refills: 0 | Status: SHIPPED | OUTPATIENT
Start: 2025-04-01

## 2025-04-01 NOTE — TELEPHONE ENCOUNTER
Rx Refill Note  Requested Prescriptions     Pending Prescriptions Disp Refills    atorvastatin (LIPITOR) 20 MG tablet [Pharmacy Med Name: ATORVASTATIN 20MG TABLETS] 30 tablet 2     Sig: TAKE 1 TABLET BY MOUTH DAILY      Last office visit with prescribing clinician: 3/5/2025   Last telemedicine visit with prescribing clinician: Visit date not found   Next office visit with prescribing clinician: 4/7/2025

## 2025-04-02 RX ORDER — DIAZEPAM 2 MG/1
2 TABLET ORAL 2 TIMES DAILY PRN
Qty: 10 TABLET | Refills: 0 | Status: SHIPPED | OUTPATIENT
Start: 2025-04-02

## 2025-04-07 ENCOUNTER — OFFICE VISIT (OUTPATIENT)
Dept: FAMILY MEDICINE CLINIC | Facility: CLINIC | Age: 55
End: 2025-04-07
Payer: MEDICARE

## 2025-04-07 VITALS
OXYGEN SATURATION: 96 % | WEIGHT: 220 LBS | RESPIRATION RATE: 22 BRPM | DIASTOLIC BLOOD PRESSURE: 86 MMHG | BODY MASS INDEX: 36.65 KG/M2 | HEIGHT: 65 IN | HEART RATE: 86 BPM | SYSTOLIC BLOOD PRESSURE: 140 MMHG

## 2025-04-07 DIAGNOSIS — F33.2 SEVERE EPISODE OF RECURRENT MAJOR DEPRESSIVE DISORDER, WITHOUT PSYCHOTIC FEATURES: ICD-10-CM

## 2025-04-07 DIAGNOSIS — F43.10 PTSD (POST-TRAUMATIC STRESS DISORDER): ICD-10-CM

## 2025-04-07 DIAGNOSIS — F51.01 PRIMARY INSOMNIA: ICD-10-CM

## 2025-04-07 DIAGNOSIS — F42.2 MIXED OBSESSIONAL THOUGHTS AND ACTS: ICD-10-CM

## 2025-04-07 RX ORDER — PREGABALIN 50 MG/1
50 CAPSULE ORAL 3 TIMES DAILY
Qty: 90 CAPSULE | Refills: 0 | Status: SHIPPED | OUTPATIENT
Start: 2025-04-07

## 2025-04-07 NOTE — PROGRESS NOTES
Andrae Shore,   [facility]  [address]  [place] [id number] [phone]    Subjective      Name Emily Garcia MRN [id number]    1970 AGE/SEX 54 y.o. / female      Chief Complaint Chief Complaint   Patient presents with    PTSD     One month follow up     Skin Problem     On legs          Visit History for  2025    Emily Garcia is a 54 y.o. female who presented today for PTSD (One month follow up ) and Skin Problem (On legs )       History of Present Illness  She reports no adverse reactions to her current medication regimen. However, she has not yet completed the [employer] test, which she plans to do within the week. Her anxiety levels remain high, with no significant improvement noted since her last visit. She describes herself as challenging to medicate. Despite taking Seroquel 200 mg at night, which aids in sleep initiation, she experiences awakenings at night but is able to return to sleep promptly. She believes her daytime fatigue may be due to a lack of physical activity. She continues to use Valium on an as-needed basis, particularly when experiencing heightened anxiety before bedtime or during the day. She has previously tried gabapentin for depression, which was beneficial but led to increased appetite. She has also taken Lyrica in the past. She is considering adjusting the timing of her medication intake, as she currently takes all her medications at night.       Medications and Allergies   Current Outpatient Medications   Medication Instructions    atorvastatin (LIPITOR) 20 mg, Oral, Daily    Combivent Respimat  MCG/ACT inhaler INHALE 1 PUFF BY MOUTH FOUR TIMES DAILY AS NEEDED FOR WHEEZING    diazePAM (VALIUM) 2 mg, Oral, 2 Times Daily PRN, for anxiety    fluticasone (FLOVENT HFA) 110 MCG/ACT inhaler 1 puff, Inhalation, 2 Times Daily    hydroCHLOROthiazide 12.5 mg, Oral, Daily    meloxicam (MOBIC) 15 mg, Oral, Daily    omeprazole  "(PRILOSEC) 20 mg, Oral, Daily    pregabalin (LYRICA) 50 mg, Oral, 3 Times Daily    QUEtiapine (SEROQUEL) 200 mg, Oral, Nightly    Vortioxetine HBr (TRINTELLIX) 10 mg, Oral, Daily With Breakfast     No Known Allergies   I have reviewed the above medications and allergies     Objective:      Vitals Vitals:    04/07/25 1500   BP: 140/86   BP Location: Left arm   Patient Position: Sitting   Cuff Size: Adult   Pulse: 86   Resp: 22   SpO2: 96%   Weight: 99.8 kg (220 lb)   Height: 165.1 cm (65\")     Body mass index is 36.61 kg/m².    Physical Exam  Vitals reviewed.   Constitutional:       General: She is not in acute distress.     Appearance: She is not ill-appearing.   Pulmonary:      Effort: Pulmonary effort is normal.   Psychiatric:         Mood and Affect: Mood normal.         Behavior: Behavior normal.         Thought Content: Thought content normal.         Judgment: Judgment normal.          Physical Exam       Results       Assessment/Plan   Issues Addressed/ Plan   Diagnosis Plan   1. PTSD (post-traumatic stress disorder)  pregabalin (Lyrica) 50 MG capsule      2. Severe episode of recurrent major depressive disorder, without psychotic features  pregabalin (Lyrica) 50 MG capsule      3. Mixed obsessional thoughts and acts  pregabalin (Lyrica) 50 MG capsule      4. Primary insomnia           Assessment & Plan  1. Anxiety.  She reports no significant change in her anxiety levels despite being on medication. She is currently taking Seroquel 200 mg at night, which helps her sleep, although she still experiences daytime fatigue. She uses Valium as needed for severe anxiety, particularly before bedtime or during high-anxiety days. She is advised to complete the [employer]ight test this week to better tailor her medication regimen. She is encouraged to engage in physical activities such as dancing or walking in the sun for at least 10 minutes daily. A prescription for pregabalin 50 mg, to be taken three times daily, has been " provided to help manage her anxiety and modify the effects of Trintellix. She is also advised to explore local therapists through the [employer] website.    Follow-up  The patient will follow up in 3 weeks.        There are no Patient Instructions on file for this visit.   Follow up  recommended Return in about 3 weeks (around 4/28/2025) for Anxiety.   - Dragon voice recognition software was utilized to complete this chart.  Every reasonable attempt was made to edit and correct the text, however some incorrect words may remain.   Andrae Shore DO    Patient or patient representative verbalized consent for the use of Ambient Listening during the visit with  Andrae Shore DO for chart documentation. 4/24/2025  22:24 EDT

## 2025-04-15 DIAGNOSIS — F51.01 PRIMARY INSOMNIA: ICD-10-CM

## 2025-04-15 DIAGNOSIS — F43.10 PTSD (POST-TRAUMATIC STRESS DISORDER): ICD-10-CM

## 2025-04-15 RX ORDER — QUETIAPINE FUMARATE 150 MG/1
1 TABLET, FILM COATED ORAL NIGHTLY
Qty: 30 TABLET | Refills: 2 | OUTPATIENT
Start: 2025-04-15

## 2025-05-07 DIAGNOSIS — F33.2 SEVERE EPISODE OF RECURRENT MAJOR DEPRESSIVE DISORDER, WITHOUT PSYCHOTIC FEATURES: ICD-10-CM

## 2025-05-07 DIAGNOSIS — F43.10 PTSD (POST-TRAUMATIC STRESS DISORDER): ICD-10-CM

## 2025-05-07 DIAGNOSIS — K21.9 GASTROESOPHAGEAL REFLUX DISEASE WITHOUT ESOPHAGITIS: ICD-10-CM

## 2025-05-08 DIAGNOSIS — F33.2 SEVERE EPISODE OF RECURRENT MAJOR DEPRESSIVE DISORDER, WITHOUT PSYCHOTIC FEATURES: ICD-10-CM

## 2025-05-08 DIAGNOSIS — F51.01 PRIMARY INSOMNIA: ICD-10-CM

## 2025-05-08 DIAGNOSIS — F42.2 MIXED OBSESSIONAL THOUGHTS AND ACTS: ICD-10-CM

## 2025-05-08 DIAGNOSIS — M25.512 CHRONIC LEFT SHOULDER PAIN: ICD-10-CM

## 2025-05-08 DIAGNOSIS — G89.29 CHRONIC LEFT SHOULDER PAIN: ICD-10-CM

## 2025-05-08 DIAGNOSIS — F43.10 PTSD (POST-TRAUMATIC STRESS DISORDER): ICD-10-CM

## 2025-05-08 RX ORDER — OMEPRAZOLE 20 MG/1
20 CAPSULE, DELAYED RELEASE ORAL DAILY
Qty: 90 CAPSULE | Refills: 1 | Status: SHIPPED | OUTPATIENT
Start: 2025-05-08

## 2025-05-09 RX ORDER — VORTIOXETINE 10 MG/1
10 TABLET, FILM COATED ORAL
Qty: 30 TABLET | Refills: 1 | Status: SHIPPED | OUTPATIENT
Start: 2025-05-09

## 2025-05-09 RX ORDER — QUETIAPINE FUMARATE 200 MG/1
200 TABLET, FILM COATED ORAL NIGHTLY
Qty: 30 TABLET | Refills: 1 | Status: SHIPPED | OUTPATIENT
Start: 2025-05-09

## 2025-05-09 RX ORDER — MELOXICAM 15 MG/1
15 TABLET ORAL DAILY
Qty: 30 TABLET | Refills: 0 | Status: SHIPPED | OUTPATIENT
Start: 2025-05-09

## 2025-05-11 RX ORDER — DIAZEPAM 2 MG/1
2 TABLET ORAL 2 TIMES DAILY PRN
Qty: 10 TABLET | Refills: 0 | Status: SHIPPED | OUTPATIENT
Start: 2025-05-11

## 2025-05-25 DIAGNOSIS — J41.0 SIMPLE CHRONIC BRONCHITIS: ICD-10-CM

## 2025-05-27 RX ORDER — FLUTICASONE PROPIONATE 110 UG/1
1 AEROSOL, METERED RESPIRATORY (INHALATION) 2 TIMES DAILY
Qty: 12 G | Refills: 3 | Status: SHIPPED | OUTPATIENT
Start: 2025-05-27

## 2025-06-10 DIAGNOSIS — F42.2 MIXED OBSESSIONAL THOUGHTS AND ACTS: ICD-10-CM

## 2025-06-10 DIAGNOSIS — F43.10 PTSD (POST-TRAUMATIC STRESS DISORDER): ICD-10-CM

## 2025-06-10 DIAGNOSIS — F33.2 SEVERE EPISODE OF RECURRENT MAJOR DEPRESSIVE DISORDER, WITHOUT PSYCHOTIC FEATURES: ICD-10-CM

## 2025-06-10 NOTE — TELEPHONE ENCOUNTER
Rx Refill Note  Requested Prescriptions     Pending Prescriptions Disp Refills    pregabalin (Lyrica) 50 MG capsule 90 capsule 0     Sig: Take 1 capsule by mouth 3 (Three) Times a Day.      Last office visit with prescribing clinician: 4/7/2025   Last telemedicine visit with prescribing clinician: Visit date not found   Next office visit with prescribing clinician: Visit date not found

## 2025-06-11 RX ORDER — PREGABALIN 50 MG/1
50 CAPSULE ORAL 3 TIMES DAILY
Qty: 90 CAPSULE | Refills: 0 | Status: SHIPPED | OUTPATIENT
Start: 2025-06-11

## 2025-06-16 DIAGNOSIS — F33.2 SEVERE EPISODE OF RECURRENT MAJOR DEPRESSIVE DISORDER, WITHOUT PSYCHOTIC FEATURES: ICD-10-CM

## 2025-06-16 DIAGNOSIS — F43.10 PTSD (POST-TRAUMATIC STRESS DISORDER): ICD-10-CM

## 2025-06-16 NOTE — TELEPHONE ENCOUNTER
Rx Refill Note  Requested Prescriptions     Pending Prescriptions Disp Refills    diazePAM (VALIUM) 2 MG tablet 10 tablet 0     Sig: Take 1 tablet by mouth 2 (Two) Times a Day As Needed. for anxiety      Last office visit with prescribing clinician: 4/7/2025   Last telemedicine visit with prescribing clinician: Visit date not found   Next office visit with prescribing clinician: Visit date not found

## 2025-06-17 RX ORDER — DIAZEPAM 2 MG/1
2 TABLET ORAL 2 TIMES DAILY PRN
Qty: 10 TABLET | Refills: 0 | Status: SHIPPED | OUTPATIENT
Start: 2025-06-17

## 2025-07-14 DIAGNOSIS — F42.2 MIXED OBSESSIONAL THOUGHTS AND ACTS: ICD-10-CM

## 2025-07-14 DIAGNOSIS — F43.10 PTSD (POST-TRAUMATIC STRESS DISORDER): ICD-10-CM

## 2025-07-14 DIAGNOSIS — F51.01 PRIMARY INSOMNIA: ICD-10-CM

## 2025-07-14 DIAGNOSIS — F33.2 SEVERE EPISODE OF RECURRENT MAJOR DEPRESSIVE DISORDER, WITHOUT PSYCHOTIC FEATURES: ICD-10-CM

## 2025-07-14 NOTE — TELEPHONE ENCOUNTER
Rx Refill Note  Requested Prescriptions     Pending Prescriptions Disp Refills    QUEtiapine (SEROquel) 200 MG tablet 30 tablet 1     Sig: Take 1 tablet by mouth Every Night.      Last office visit with prescribing clinician: 4/7/2025   Last telemedicine visit with prescribing clinician: Visit date not found   Next office visit with prescribing clinician: Visit date not found

## 2025-07-15 RX ORDER — QUETIAPINE FUMARATE 200 MG/1
200 TABLET, FILM COATED ORAL NIGHTLY
Qty: 30 TABLET | Refills: 1 | Status: SHIPPED | OUTPATIENT
Start: 2025-07-15

## 2025-07-24 DIAGNOSIS — E78.00 HYPERCHOLESTEROLEMIA: ICD-10-CM

## 2025-07-24 RX ORDER — ATORVASTATIN CALCIUM 20 MG/1
20 TABLET, FILM COATED ORAL DAILY
Qty: 30 TABLET | Refills: 2 | Status: SHIPPED | OUTPATIENT
Start: 2025-07-24

## 2025-07-28 DIAGNOSIS — F42.2 MIXED OBSESSIONAL THOUGHTS AND ACTS: ICD-10-CM

## 2025-07-28 DIAGNOSIS — F33.2 SEVERE EPISODE OF RECURRENT MAJOR DEPRESSIVE DISORDER, WITHOUT PSYCHOTIC FEATURES: ICD-10-CM

## 2025-07-28 DIAGNOSIS — F51.01 PRIMARY INSOMNIA: ICD-10-CM

## 2025-07-28 DIAGNOSIS — F43.10 PTSD (POST-TRAUMATIC STRESS DISORDER): ICD-10-CM

## 2025-07-28 RX ORDER — QUETIAPINE FUMARATE 200 MG/1
200 TABLET, FILM COATED ORAL NIGHTLY
Qty: 30 TABLET | Refills: 1 | OUTPATIENT
Start: 2025-07-28

## 2025-08-09 DIAGNOSIS — F33.2 SEVERE EPISODE OF RECURRENT MAJOR DEPRESSIVE DISORDER, WITHOUT PSYCHOTIC FEATURES: ICD-10-CM

## 2025-08-09 DIAGNOSIS — F43.10 PTSD (POST-TRAUMATIC STRESS DISORDER): ICD-10-CM

## 2025-08-11 RX ORDER — DIAZEPAM 2 MG/1
2 TABLET ORAL 2 TIMES DAILY PRN
Qty: 10 TABLET | Refills: 0 | Status: SHIPPED | OUTPATIENT
Start: 2025-08-11